# Patient Record
Sex: MALE | Race: WHITE | NOT HISPANIC OR LATINO
[De-identification: names, ages, dates, MRNs, and addresses within clinical notes are randomized per-mention and may not be internally consistent; named-entity substitution may affect disease eponyms.]

---

## 2017-04-09 ENCOUNTER — HOSPITAL (OUTPATIENT)
Dept: OTHER | Age: 72
End: 2017-04-09
Attending: EMERGENCY MEDICINE

## 2018-02-01 PROBLEM — I70.0 THORACIC AORTA ATHEROSCLEROSIS (HCC): Status: ACTIVE | Noted: 2018-02-01

## 2018-02-01 PROBLEM — Z86.010 HISTORY OF COLON POLYPS: Status: ACTIVE | Noted: 2018-02-01

## 2018-02-01 PROBLEM — I10 ESSENTIAL HYPERTENSION: Status: ACTIVE | Noted: 2018-02-01

## 2018-02-01 PROBLEM — M47.816 ARTHRITIS, LUMBAR SPINE: Status: ACTIVE | Noted: 2018-02-01

## 2018-02-01 PROBLEM — I70.0 AORTIC CALCIFICATION (HCC): Status: ACTIVE | Noted: 2018-02-01

## 2018-02-21 PROCEDURE — 82043 UR ALBUMIN QUANTITATIVE: CPT | Performed by: INTERNAL MEDICINE

## 2018-02-21 PROCEDURE — 84153 ASSAY OF PSA TOTAL: CPT | Performed by: INTERNAL MEDICINE

## 2018-02-21 PROCEDURE — 82570 ASSAY OF URINE CREATININE: CPT | Performed by: INTERNAL MEDICINE

## 2019-04-06 PROBLEM — M47.816 FACET ARTHRITIS OF LUMBAR REGION: Status: ACTIVE | Noted: 2019-04-06

## 2019-04-06 PROBLEM — N18.30 CKD (CHRONIC KIDNEY DISEASE) STAGE 3, GFR 30-59 ML/MIN (HCC): Status: ACTIVE | Noted: 2019-04-06

## 2020-09-09 PROBLEM — M47.816 ARTHRITIS OF FACET JOINT OF LUMBAR SPINE: Status: ACTIVE | Noted: 2019-04-06

## 2021-02-01 DIAGNOSIS — Z23 NEED FOR VACCINATION: ICD-10-CM

## 2021-02-03 ENCOUNTER — IMMUNIZATION (OUTPATIENT)
Dept: LAB | Age: 76
End: 2021-02-03
Attending: HOSPITALIST
Payer: MEDICARE

## 2021-02-03 DIAGNOSIS — Z23 NEED FOR VACCINATION: Primary | ICD-10-CM

## 2021-02-03 PROCEDURE — 0001A SARSCOV2 VAC 30MCG/0.3ML IM: CPT

## 2021-02-24 ENCOUNTER — IMMUNIZATION (OUTPATIENT)
Dept: LAB | Age: 76
End: 2021-02-24
Attending: HOSPITALIST
Payer: MEDICARE

## 2021-02-24 DIAGNOSIS — Z23 NEED FOR VACCINATION: Primary | ICD-10-CM

## 2021-02-24 PROCEDURE — 0002A SARSCOV2 VAC 30MCG/0.3ML IM: CPT

## 2021-06-16 PROBLEM — M47.816 ARTHRITIS OF FACET JOINT OF LUMBAR SPINE: Status: RESOLVED | Noted: 2019-04-06 | Resolved: 2021-06-16

## 2021-08-12 PROBLEM — E03.9 HYPOTHYROIDISM, UNSPECIFIED TYPE: Status: ACTIVE | Noted: 2021-08-12

## 2021-12-22 PROBLEM — M46.96 UNSPECIFIED INFLAMMATORY SPONDYLOPATHY, LUMBAR REGION (HCC): Status: ACTIVE | Noted: 2021-12-22

## 2024-07-12 ENCOUNTER — APPOINTMENT (OUTPATIENT)
Dept: GENERAL RADIOLOGY | Facility: HOSPITAL | Age: 79
End: 2024-07-12
Attending: ORTHOPAEDIC SURGERY
Payer: MEDICARE

## 2024-07-12 ENCOUNTER — APPOINTMENT (OUTPATIENT)
Dept: CT IMAGING | Facility: HOSPITAL | Age: 79
End: 2024-07-12
Attending: EMERGENCY MEDICINE
Payer: MEDICARE

## 2024-07-12 ENCOUNTER — APPOINTMENT (OUTPATIENT)
Dept: GENERAL RADIOLOGY | Facility: HOSPITAL | Age: 79
End: 2024-07-12
Attending: EMERGENCY MEDICINE
Payer: MEDICARE

## 2024-07-12 ENCOUNTER — APPOINTMENT (OUTPATIENT)
Dept: MRI IMAGING | Facility: HOSPITAL | Age: 79
End: 2024-07-12
Attending: ORTHOPAEDIC SURGERY
Payer: MEDICARE

## 2024-07-12 ENCOUNTER — HOSPITAL ENCOUNTER (INPATIENT)
Facility: HOSPITAL | Age: 79
LOS: 2 days | Discharge: HOME HEALTH | End: 2024-07-14
Attending: EMERGENCY MEDICINE
Payer: MEDICARE

## 2024-07-12 ENCOUNTER — HOSPITAL ENCOUNTER (INPATIENT)
Facility: HOSPITAL | Age: 79
LOS: 2 days | Discharge: HOME HEALTH | End: 2024-07-14
Attending: EMERGENCY MEDICINE | Admitting: HOSPITALIST
Payer: MEDICARE

## 2024-07-12 DIAGNOSIS — S72.001A CLOSED RIGHT HIP FRACTURE, INITIAL ENCOUNTER (HCC): Primary | ICD-10-CM

## 2024-07-12 LAB
ANION GAP SERPL CALC-SCNC: 9 MMOL/L (ref 0–18)
ATRIAL RATE: 49 BPM
BASOPHILS # BLD AUTO: 0.02 X10(3) UL (ref 0–0.2)
BASOPHILS NFR BLD AUTO: 0.2 %
BUN BLD-MCNC: 29 MG/DL (ref 9–23)
BUN/CREAT SERPL: 18 (ref 10–20)
CALCIUM BLD-MCNC: 8.9 MG/DL (ref 8.7–10.4)
CHLORIDE SERPL-SCNC: 106 MMOL/L (ref 98–112)
CO2 SERPL-SCNC: 23 MMOL/L (ref 21–32)
CREAT BLD-MCNC: 1.61 MG/DL
DEPRECATED RDW RBC AUTO: 41 FL (ref 35.1–46.3)
EGFRCR SERPLBLD CKD-EPI 2021: 43 ML/MIN/1.73M2 (ref 60–?)
EOSINOPHIL # BLD AUTO: 0.06 X10(3) UL (ref 0–0.7)
EOSINOPHIL NFR BLD AUTO: 0.5 %
ERYTHROCYTE [DISTWIDTH] IN BLOOD BY AUTOMATED COUNT: 12.4 % (ref 11–15)
FLUAV + FLUBV RNA SPEC NAA+PROBE: NEGATIVE
FLUAV + FLUBV RNA SPEC NAA+PROBE: NEGATIVE
GLUCOSE BLD-MCNC: 139 MG/DL (ref 70–99)
HCT VFR BLD AUTO: 43.5 %
HGB BLD-MCNC: 15.1 G/DL
IMM GRANULOCYTES # BLD AUTO: 0.06 X10(3) UL (ref 0–1)
IMM GRANULOCYTES NFR BLD: 0.5 %
LYMPHOCYTES # BLD AUTO: 1.5 X10(3) UL (ref 1–4)
LYMPHOCYTES NFR BLD AUTO: 13.5 %
MCH RBC QN AUTO: 31.1 PG (ref 26–34)
MCHC RBC AUTO-ENTMCNC: 34.7 G/DL (ref 31–37)
MCV RBC AUTO: 89.7 FL
MONOCYTES # BLD AUTO: 0.72 X10(3) UL (ref 0.1–1)
MONOCYTES NFR BLD AUTO: 6.5 %
MRSA NASAL: NEGATIVE
NEUTROPHILS # BLD AUTO: 8.75 X10 (3) UL (ref 1.5–7.7)
NEUTROPHILS # BLD AUTO: 8.75 X10(3) UL (ref 1.5–7.7)
NEUTROPHILS NFR BLD AUTO: 78.8 %
OSMOLALITY SERPL CALC.SUM OF ELEC: 294 MOSM/KG (ref 275–295)
P AXIS: 37 DEGREES
P-R INTERVAL: 146 MS
PLATELET # BLD AUTO: 296 10(3)UL (ref 150–450)
POTASSIUM SERPL-SCNC: 3.3 MMOL/L (ref 3.5–5.1)
Q-T INTERVAL: 450 MS
QRS DURATION: 86 MS
QTC CALCULATION (BEZET): 406 MS
R AXIS: 2 DEGREES
RBC # BLD AUTO: 4.85 X10(6)UL
RSV RNA SPEC NAA+PROBE: NEGATIVE
SARS-COV-2 RNA RESP QL NAA+PROBE: NOT DETECTED
SODIUM SERPL-SCNC: 138 MMOL/L (ref 136–145)
STAPH A BY PCR: POSITIVE
T AXIS: 9 DEGREES
VENTRICULAR RATE: 49 BPM
WBC # BLD AUTO: 11.1 X10(3) UL (ref 4–11)

## 2024-07-12 PROCEDURE — 93010 ELECTROCARDIOGRAM REPORT: CPT | Performed by: STUDENT IN AN ORGANIZED HEALTH CARE EDUCATION/TRAINING PROGRAM

## 2024-07-12 PROCEDURE — 96374 THER/PROPH/DIAG INJ IV PUSH: CPT

## 2024-07-12 PROCEDURE — 96376 TX/PRO/DX INJ SAME DRUG ADON: CPT

## 2024-07-12 PROCEDURE — 80048 BASIC METABOLIC PNL TOTAL CA: CPT | Performed by: EMERGENCY MEDICINE

## 2024-07-12 PROCEDURE — 87640 STAPH A DNA AMP PROBE: CPT | Performed by: EMERGENCY MEDICINE

## 2024-07-12 PROCEDURE — 73700 CT LOWER EXTREMITY W/O DYE: CPT | Performed by: EMERGENCY MEDICINE

## 2024-07-12 PROCEDURE — 73502 X-RAY EXAM HIP UNI 2-3 VIEWS: CPT | Performed by: EMERGENCY MEDICINE

## 2024-07-12 PROCEDURE — 99285 EMERGENCY DEPT VISIT HI MDM: CPT

## 2024-07-12 PROCEDURE — 96375 TX/PRO/DX INJ NEW DRUG ADDON: CPT

## 2024-07-12 PROCEDURE — 73721 MRI JNT OF LWR EXTRE W/O DYE: CPT | Performed by: ORTHOPAEDIC SURGERY

## 2024-07-12 PROCEDURE — 0241U SARS-COV-2/FLU A AND B/RSV BY PCR (GENEXPERT): CPT | Performed by: EMERGENCY MEDICINE

## 2024-07-12 PROCEDURE — 93005 ELECTROCARDIOGRAM TRACING: CPT

## 2024-07-12 PROCEDURE — 87641 MR-STAPH DNA AMP PROBE: CPT | Performed by: EMERGENCY MEDICINE

## 2024-07-12 PROCEDURE — 71045 X-RAY EXAM CHEST 1 VIEW: CPT | Performed by: EMERGENCY MEDICINE

## 2024-07-12 PROCEDURE — 85025 COMPLETE CBC W/AUTO DIFF WBC: CPT | Performed by: EMERGENCY MEDICINE

## 2024-07-12 RX ORDER — LOSARTAN POTASSIUM 100 MG/1
100 TABLET ORAL DAILY
Status: DISCONTINUED | OUTPATIENT
Start: 2024-07-12 | End: 2024-07-14

## 2024-07-12 RX ORDER — HYDROCODONE BITARTRATE AND ACETAMINOPHEN 5; 325 MG/1; MG/1
2 TABLET ORAL EVERY 4 HOURS PRN
Status: DISCONTINUED | OUTPATIENT
Start: 2024-07-12 | End: 2024-07-14

## 2024-07-12 RX ORDER — TRIAMTERENE AND HYDROCHLOROTHIAZIDE 37.5; 25 MG/1; MG/1
1 CAPSULE ORAL EVERY MORNING
Status: DISCONTINUED | OUTPATIENT
Start: 2024-07-12 | End: 2024-07-14

## 2024-07-12 RX ORDER — MORPHINE SULFATE 4 MG/ML
4 INJECTION, SOLUTION INTRAMUSCULAR; INTRAVENOUS ONCE
Status: COMPLETED | OUTPATIENT
Start: 2024-07-12 | End: 2024-07-12

## 2024-07-12 RX ORDER — KETOROLAC TROMETHAMINE 30 MG/ML
30 INJECTION, SOLUTION INTRAMUSCULAR; INTRAVENOUS ONCE
Status: COMPLETED | OUTPATIENT
Start: 2024-07-12 | End: 2024-07-12

## 2024-07-12 RX ORDER — ACETAMINOPHEN 500 MG
1000 TABLET ORAL ONCE
Status: COMPLETED | OUTPATIENT
Start: 2024-07-12 | End: 2024-07-12

## 2024-07-12 RX ORDER — METOCLOPRAMIDE HYDROCHLORIDE 5 MG/ML
5 INJECTION INTRAMUSCULAR; INTRAVENOUS EVERY 8 HOURS PRN
Status: DISCONTINUED | OUTPATIENT
Start: 2024-07-12 | End: 2024-07-14

## 2024-07-12 RX ORDER — MORPHINE SULFATE 4 MG/ML
4 INJECTION, SOLUTION INTRAMUSCULAR; INTRAVENOUS EVERY 2 HOUR PRN
Status: DISCONTINUED | OUTPATIENT
Start: 2024-07-12 | End: 2024-07-14

## 2024-07-12 RX ORDER — ZOLPIDEM TARTRATE 5 MG/1
10 TABLET ORAL NIGHTLY PRN
Status: DISCONTINUED | OUTPATIENT
Start: 2024-07-12 | End: 2024-07-12

## 2024-07-12 RX ORDER — MORPHINE SULFATE 2 MG/ML
2 INJECTION, SOLUTION INTRAMUSCULAR; INTRAVENOUS EVERY 2 HOUR PRN
Status: DISCONTINUED | OUTPATIENT
Start: 2024-07-12 | End: 2024-07-14

## 2024-07-12 RX ORDER — HYDROCODONE BITARTRATE AND ACETAMINOPHEN 5; 325 MG/1; MG/1
1 TABLET ORAL EVERY 4 HOURS PRN
Status: DISCONTINUED | OUTPATIENT
Start: 2024-07-12 | End: 2024-07-14

## 2024-07-12 RX ORDER — SODIUM CHLORIDE 9 MG/ML
INJECTION, SOLUTION INTRAVENOUS CONTINUOUS
Status: DISCONTINUED | OUTPATIENT
Start: 2024-07-12 | End: 2024-07-13

## 2024-07-12 RX ORDER — HEPARIN SODIUM 5000 [USP'U]/ML
5000 INJECTION, SOLUTION INTRAVENOUS; SUBCUTANEOUS EVERY 8 HOURS SCHEDULED
Status: DISCONTINUED | OUTPATIENT
Start: 2024-07-13 | End: 2024-07-14

## 2024-07-12 RX ORDER — LORAZEPAM 1 MG/1
0.5 TABLET ORAL EVERY 4 HOURS PRN
Status: DISCONTINUED | OUTPATIENT
Start: 2024-07-12 | End: 2024-07-14

## 2024-07-12 RX ORDER — ACETAMINOPHEN 325 MG/1
650 TABLET ORAL EVERY 4 HOURS PRN
Status: DISCONTINUED | OUTPATIENT
Start: 2024-07-12 | End: 2024-07-14

## 2024-07-12 RX ORDER — MORPHINE SULFATE 2 MG/ML
1 INJECTION, SOLUTION INTRAMUSCULAR; INTRAVENOUS EVERY 2 HOUR PRN
Status: DISCONTINUED | OUTPATIENT
Start: 2024-07-12 | End: 2024-07-14

## 2024-07-12 RX ORDER — ONDANSETRON 2 MG/ML
4 INJECTION INTRAMUSCULAR; INTRAVENOUS EVERY 6 HOURS PRN
Status: DISCONTINUED | OUTPATIENT
Start: 2024-07-12 | End: 2024-07-14

## 2024-07-12 RX ORDER — OXYCODONE HYDROCHLORIDE 5 MG/1
5 TABLET ORAL ONCE
Status: COMPLETED | OUTPATIENT
Start: 2024-07-12 | End: 2024-07-12

## 2024-07-12 RX ORDER — LEVOTHYROXINE SODIUM 0.03 MG/1
25 TABLET ORAL
Status: DISCONTINUED | OUTPATIENT
Start: 2024-07-13 | End: 2024-07-14

## 2024-07-12 RX ORDER — CYCLOBENZAPRINE HCL 5 MG
10 TABLET ORAL 3 TIMES DAILY PRN
Status: DISCONTINUED | OUTPATIENT
Start: 2024-07-12 | End: 2024-07-14

## 2024-07-12 RX ORDER — LORAZEPAM 1 MG/1
0.5 TABLET ORAL ONCE
Status: DISCONTINUED | OUTPATIENT
Start: 2024-07-12 | End: 2024-07-12

## 2024-07-12 NOTE — H&P
[facility] [place]       CC:   Chief Complaint   Patient presents with    Hip Pain        PCP: Norman Townsend MD    Date of Admission: 7/12/2024  7:23 AM    ASSESSMENT / PLAN:       Mr. Hammer is an 80 yo M with PMH of CKD3, HTN, hypothyroidism who presented with hip pain found to have hip fracture    R hip fracture  - XR normal but CT with nondisplaced femur fracture  - ortho consulted  - NPO, IVF, pain meds PRN    Pre-op clearance  - no hx of CP, SOB, no HF symptoms  - no hx of Afib or arrythmia, CAD/MI, TIA/CVA  - no issues with prior anesthesia, did have issues with IV   - baseline labs acceptable  - await EKG and CXR   - pending EKG and CXR results, acceptable risk for surgery     HTN  - BP stable  - hold home losartan, triamerene-hydrochlorothiazide in case plan for surgery today    CKD3  - Cr 1.4- 1.5 at baseline  - admission BMP- Cr 1.6    Hypothyroidism  - continue synthroid 25 mcg daily    ACP  - CODE- FULL confirmed with patient and family  - POA- daughter Liza Hammer  - lives at home with 2 adult children, daughter has back problems    FN:  - IVF: NS  - Diet: NPO    DVT Prophy: SCD  Lines: PIV    Dispo: pending clinical course    Outpatient records or previous hospital records reviewed.     Further recommendations pending patient's clinical course.  [facility] [place] to follow patient while in house    Patient and/or patient's family given opportunity to ask questions and note understanding and agreeing with therapeutic plan as outlined    Dominique Pineda MD  [facility] [place]  Answering Service number: [phone]    HPI       History of Present Illness:     Mr. Hammer is an 80 yo M with PMH of CKD3, HTN, hypothyroidism who presented with hip pain. Patient states hip pain started yesterday while he was walking in his home. Denies trauma. States he has been having issues with is R hip for a while. Recalls an event 7 years ago when he was walking up the stairs carrying a case  of water bottles and had severe hip pain, felt like he pulled something. Had to use crutches for a week. States he went to urgent care and had an XR at that time that was negative.     In the ED, vitals stable. XR hip negative. CT hip with fracture. Ortho consulted.       PMH  Past Medical History:    Aortic calcification (HCC)    Arthritis, lumbar spine    CKD (chronic kidney disease)    CKD (chronic kidney disease) stage 3, GFR 30-59 ml/min (HCC)    Diverticulitis    Essential hypertension    Facet arthritis of lumbar region    H/O left hemicolectomy    History of colon polyps    HTN (hypertension)    Hypothyroidism, unspecified type    Thoracic aorta atherosclerosis (HCC)    Unspecified inflammatory spondylopathy, lumbar region (HCC)        PSH  History reviewed. No pertinent surgical history.     ALL:  Allergies   Allergen Reactions    Demerol UNKNOWN    Fentanyl         Home Medications:  No outpatient medications have been marked as taking for the 24 encounter (Hospital Encounter).         Soc Hx  Social History     Tobacco Use    Smoking status: Former     Current packs/day: 0.00     Types: Cigarettes     Quit date: 1959     Years since quittin.5    Smokeless tobacco: Never   Substance Use Topics    Alcohol use: Not Currently     Comment: rarely        Fam Hx  Family History   Problem Relation Age of Onset    Heart Disorder Mother        Review of Systems  Comprehensive ROS reviewed and negative except for what's stated above.     OBJECTIVE:  /62   Pulse 55   Temp 97.5 °F (36.4 °C) (Oral)   Resp 14   Ht 5' 6\" (1.676 m)   Wt 170 lb (77.1 kg)   SpO2 96%   BMI 27.44 kg/m²     GEN: male in NAD, appears younger than stated age  HEENT: EOMI,  Pulm: CTAB, no crackles or wheezes  CV: RRR, no murmurs  ABD: Soft, non-tender, non-distended, +BS  SKIN: warm, dry  EXT: no edema    Diagnostic Data:    CBC/Chem    Recent Labs   Lab 24  1029   RBC 4.85   HGB 15.1   HCT 43.5   MCV 89.7   MCH  31.1   MCHC 34.7   RDW 12.4   NEPRELIM 8.75*   WBC 11.1*   .0         No results for input(s): \"GLU\", \"BUN\", \"CREATSERUM\", \"GFRAA\", \"GFRNAA\", \"EGFRCR\", \"CA\", \"NA\", \"K\", \"CL\", \"CO2\" in the last 168 hours.  Lab Results   Component Value Date    WBC 11.1 07/12/2024    HGB 15.1 07/12/2024    HCT 43.5 07/12/2024    .0 07/12/2024    CREATSERUM 1.61 07/12/2024    BUN 29 07/12/2024     07/12/2024    K 3.3 07/12/2024     07/12/2024    CO2 23.0 07/12/2024     07/12/2024    CA 8.9 07/12/2024       No results for input(s): \"TROP\" in the last 168 hours.    Additional Diagnostics:     Radiology: CT HIP(BONE) RIGHT (CPT=73700)    Result Date: 7/12/2024  CONCLUSION:  Nondisplaced subcapital fracture.       Dictated by (CST): Ronen Thompson MD on 7/12/2024 at 10:29 AM     Finalized by (CST): Ronen Thompson MD on 7/12/2024 at 10:43 AM          XR HIP W OR WO PELVIS 2 OR 3 VIEWS, RIGHT (CPT=73502)    Result Date: 7/12/2024  CONCLUSION:  1. No acute appearing fracture or dislocation.  Mild central narrowing and mild degenerative change of both hip joints.    Dictated by (CST): Rene Dawson MD on 7/12/2024 at 9:02 AM     Finalized by (CST): Rene Dawson MD on 7/12/2024 at 9:03 AM

## 2024-07-12 NOTE — CONSULTS
[facility]  part of [facility]    Report of Consultation    Vick Hammer Patient Status:  Inpatient    1945 MRN [id number]   Location [facility] 4W/SW/SE Attending Dominique Pineda MD   Hosp Day # 0 PCP Norman Townsend MD     Date of Admission:  2024  Date of Consult:  2024  Reason for Consultation:   R hip pain    History of Present Illness:   Patient is a 79 year old male who was admitted to the hospital for Closed right hip fracture, initial encounter (HCC):  Pt developed pain over the last 2 days. No history of trauma. No hx of falls. Presented to [facility] ED for evaluation. Had CT which revealed possible FNFx. Admitted to the floor and my partner, Dr. Michelle Elliott, was initially called to evaluate patient. She contacted me and I agreed to see the patient for evaluation. Pt has had no CP, SOB, F/C. Does have hx of L-spine surgery in the past. Points to the lateral and posterior hip where he has the majority of his pain.    Past Medical History  Past Medical History:    Aortic calcification (HCC)    Arthritis, lumbar spine    CKD (chronic kidney disease)    CKD (chronic kidney disease) stage 3, GFR 30-59 ml/min (HCC)    Diverticulitis    Essential hypertension    Facet arthritis of lumbar region    H/O left hemicolectomy    History of colon polyps    HTN (hypertension)    Hypothyroidism, unspecified type    Thoracic aorta atherosclerosis (HCC)    Unspecified inflammatory spondylopathy, lumbar region (HCC)       Past Surgical History  History reviewed. No pertinent surgical history.    Family History  Family History   Problem Relation Age of Onset    Heart Disorder Mother        Social History  Social History     Socioeconomic History    Marital status:    Tobacco Use    Smoking status: Former     Current packs/day: 0.00     Types: Cigarettes     Quit date: 1959     Years since quittin.5    Smokeless tobacco: Never   Substance and Sexual  Activity    Alcohol use: Not Currently     Comment: rarely    Drug use: No    Sexual activity: Yes     Social Determinants of Health     Food Insecurity: No Food Insecurity (7/12/2024)    Food Insecurity     Food Insecurity: Never true   Transportation Needs: No Transportation Needs (7/12/2024)    Transportation Needs     Lack of Transportation: No   Housing Stability: Low Risk  (7/12/2024)    Housing Stability     Housing Instability: No           Current Medications:  Current Facility-Administered Medications   Medication Dose Route Frequency    sodium chloride 0.9% infusion   Intravenous Continuous    [START ON 7/13/2024] heparin (Porcine) 5000 UNIT/ML injection 5,000 Units  5,000 Units Subcutaneous Q8H LAMONT    acetaminophen (Tylenol) tab 650 mg  650 mg Oral Q4H PRN    Or    HYDROcodone-acetaminophen (Norco) 5-325 MG per tab 1 tablet  1 tablet Oral Q4H PRN    Or    HYDROcodone-acetaminophen (Norco) 5-325 MG per tab 2 tablet  2 tablet Oral Q4H PRN    morphINE PF 2 MG/ML injection 1 mg  1 mg Intravenous Q2H PRN    Or    morphINE PF 2 MG/ML injection 2 mg  2 mg Intravenous Q2H PRN    Or    morphINE PF 4 MG/ML injection 4 mg  4 mg Intravenous Q2H PRN    ondansetron (Zofran) 4 MG/2ML injection 4 mg  4 mg Intravenous Q6H PRN    metoclopramide (Reglan) 5 mg/mL injection 5 mg  5 mg Intravenous Q8H PRN    [START ON 7/13/2024] levothyroxine (Synthroid) tab 25 mcg  25 mcg Oral Before breakfast    [Held by provider] losartan (Cozaar) tab 100 mg  100 mg Oral Daily    [Held by provider] triamterene-hydroCHLOROthiazide (Dyazide) 37.5-25 MG per cap 1 capsule  1 capsule Oral QAM    LORazepam (Ativan) tab 0.5 mg  0.5 mg Oral Q4H PRN     Medications Prior to Admission   Medication Sig    Multiple Vitamins-Minerals (ONE-A-DAY FOR HIM VITACRAVES OR) Take by mouth.    cholecalciferol 1000 UNITS Oral Cap Take 2 capsules (2,000 Units total) by mouth daily.    LOSARTAN 100 MG Oral Tab TAKE 1 TABLET DAILY    TRIAMTERENE-HYDROCHLOROTHIAZIDE  37.5-25 MG Oral Cap TAKE 1 CAPSULE EVERY       MORNING    LEVOTHYROXINE 25 MCG Oral Tab TAKE 1 TABLET BEFORE       BREAKFAST       Allergies  Allergies   Allergen Reactions    Demerol UNKNOWN    Fentanyl        Review of Systems:    Pertinent items are noted in HPI.    Physical Exam:   Blood pressure 134/51, pulse 59, temperature 97.9 °F (36.6 °C), temperature source Oral, resp. rate 16, height 5' 6\" (1.676 m), weight 170 lb (77.1 kg), SpO2 96%.    General appearance: alert, appears stated age, and cooperative  RLE:   No pain with ROM  No pain with axial loading  - Kerri's  EHL, TA, GSC intact  Palp DP  LT intact 1st DWS   Results:     Laboratory Data:  Lab Results   Component Value Date    WBC 11.1 (H) 07/12/2024    HGB 15.1 07/12/2024    HCT 43.5 07/12/2024    .0 07/12/2024    CREATSERUM 1.61 (H) 07/12/2024    BUN 29 (H) 07/12/2024     07/12/2024    K 3.3 (L) 07/12/2024     07/12/2024    CO2 23.0 07/12/2024     (H) 07/12/2024    CA 8.9 07/12/2024    ALB 4.6 06/16/2021    ALKPHO 52 06/16/2021    TP 7.6 06/16/2021    AST 24 06/16/2021    ALT 31 06/16/2021    T4F 0.95 08/05/2021    TSH 3.220 12/22/2021    PSA 4.37 (H) 03/28/2022    BNP 27.0 09/17/2012    B12 847 06/16/2021         Imaging:  MRI HIP LIMITED FX (2) SEQUENCES RIGHT (CPT = 50152)    Result Date: 7/12/2024  CONCLUSION:  No MR evidence of acute fracture of the right hip.    Results of this examination were discussed with the patient's physician, Dr. Mayer, by Dr. Ramon at 1748 on 07/12/2024.  Dictated by (CST): Migel Ramon MD on 7/12/2024 at 5:48 PM     Finalized by (CST): Migel Ramon MD on 7/12/2024 at 5:54 PM          XR CHEST AP PORTABLE  (CPT=71045)    Result Date: 7/12/2024  CONCLUSION: No radiographic evidence of acute cardiopulmonary abnormality.    Dictated by (CST): Hari Rowan MD on 7/12/2024 at 1:15 PM     Finalized by (CST): Hari Rowan MD on 7/12/2024 at 1:15 PM          CT HIP(BONE) RIGHT  (CPT=73700)    Result Date: 7/12/2024  CONCLUSION:  Nondisplaced subcapital fracture.       Dictated by (CST): Ronen Thompson MD on 7/12/2024 at 10:29 AM     Finalized by (CST): Ronen Thompson MD on 7/12/2024 at 10:43 AM          XR HIP W OR WO PELVIS 2 OR 3 VIEWS, RIGHT (CPT=73502)    Result Date: 7/12/2024  CONCLUSION:  1. No acute appearing fracture or dislocation.  Mild central narrowing and mild degenerative change of both hip joints.    Dictated by (CST): Rene Dawson MD on 7/12/2024 at 9:02 AM     Finalized by (CST): Rene Dawson MD on 7/12/2024 at 9:03 AM              Impression:     Right hip pain    79M that sustained right hip pain.   -MRI confirmed no evidence of Right hip fx.  -pt will be re-admitted to floor for pain control  -WBAT RLE  -PT/OT  -no acute orthopaedic intervention needed  -may f/u in office as out patient  -call [phone] for appt  -may be  Thank you for allowing me to participate in the care of your patient.    Michi Mayer MD  7/12/2024

## 2024-07-12 NOTE — ED PROVIDER NOTES
Patient Seen in: [facility] Emergency Department      History     Chief Complaint   Patient presents with    Hip Pain     Stated Complaint: R hip pain    Subjective:   HPI    Pt is 80 yo M who arrives by EMS from home with acute on chronic right hip pain x 2 days. No recent trauma. No swelling. No radiation of pain or numbness. No fevers. Taking tylenol at home. Feels similar to bursitis attacks he has had in past but hard to ambulate due to pain. Has been using crutches.     Objective:   Past Medical History:    Aortic calcification (HCC)    Arthritis, lumbar spine    CKD (chronic kidney disease)    CKD (chronic kidney disease) stage 3, GFR 30-59 ml/min (HCC)    Diverticulitis    Essential hypertension    Facet arthritis of lumbar region    H/O left hemicolectomy    History of colon polyps    HTN (hypertension)    Hypothyroidism, unspecified type    Thoracic aorta atherosclerosis (HCC)    Unspecified inflammatory spondylopathy, lumbar region (HCC)              History reviewed. No pertinent surgical history.             Social History     Socioeconomic History    Marital status:    Tobacco Use    Smoking status: Former     Current packs/day: 0.00     Types: Cigarettes     Quit date: 1959     Years since quittin.5    Smokeless tobacco: Never   Substance and Sexual Activity    Alcohol use: Not Currently     Comment: rarely    Drug use: No    Sexual activity: Yes              Review of Systems    Positive for stated Chief Complaint: Hip Pain    Other systems are as noted in HPI.  Constitutional and vital signs reviewed.      All other systems reviewed and negative except as noted above.    Physical Exam     ED Triage Vitals [24 0726]   /83   Pulse 56   Resp 19   Temp 97.5 °F (36.4 °C)   Temp src Oral   SpO2 99 %   O2 Device None (Room air)       Current Vitals:   Vital Signs  BP: 113/60  Pulse: 53  Resp: 16  Temp: 97.5 °F (36.4 °C)  Temp src: Oral  MAP (mmHg): 76    Oxygen  Therapy  SpO2: 94 %  O2 Device: None (Room air)            Physical Exam    GENERAL: No acute distress, awake and alert, lying on left side  HEENT: EOMI, PERRL  Neck: supple  CV:2+ DP pulse, normal cap refill  Ab: soft, nontender, no distension  Extremities: no edema or deformities, compartments soft/nontender. Joints nontender. Minimal ROM of right hip  Neuro: CN intact, normal speech, normal sensation; no focal deficits  SKIN: warm, dry, no rashes      ED Course     Labs Reviewed   BASIC METABOLIC PANEL (8) - Abnormal; Notable for the following components:       Result Value    Glucose 139 (*)     Potassium 3.3 (*)     BUN 29 (*)     Creatinine 1.61 (*)     eGFR-Cr 43 (*)     All other components within normal limits   CBC W/ DIFFERENTIAL - Abnormal; Notable for the following components:    WBC 11.1 (*)     Neutrophil Absolute Prelim 8.75 (*)     Neutrophil Absolute 8.75 (*)     All other components within normal limits   CBC WITH DIFFERENTIAL WITH PLATELET    Narrative:     The following orders were created for panel order CBC With Differential With Platelet.  Procedure                               Abnormality         Status                     ---------                               -----------         ------                     CBC W/ DIFFERENTIAL[[id number]]          Abnormal            Final result                 Please view results for these tests on the individual orders.   MRSA/SA SCRN BY PCR:EMERG ORTHO SURG ONLY          MDM         Medical Decision Making  Possible bursitis attack  Morphine/toradol given  Pt continues to have pain with movement of the hip. Recommend CT imaging after unremarkable xray results.     Amount and/or Complexity of Data Reviewed  External Data Reviewed: radiology.     Details: Prior xrays, ultrasounds from last year reviewed  Labs: ordered.  Radiology: ordered.     Details: CT HIP(BONE) RIGHT (CPT=73700)    Result Date: 7/12/2024  CONCLUSION:  Nondisplaced subcapital  fracture.       Dictated by (CST): Ronen Thompson MD on 7/12/2024 at 10:29 AM     Finalized by (CST): Ronen Thompson MD on 7/12/2024 at 10:43 AM          XR HIP W OR WO PELVIS 2 OR 3 VIEWS, RIGHT (CPT=73502)    Result Date: 7/12/2024  CONCLUSION:  1. No acute appearing fracture or dislocation.  Mild central narrowing and mild degenerative change of both hip joints.    Dictated by (CST): Rene Dawson MD on 7/12/2024 at 9:02 AM     Finalized by (CST): Rene Dawson MD on 7/12/2024 at 9:03 AM            Discussion of management or test interpretation with external provider(s): D/w dr Pineda hospitalist  D/w dr Elliott orthopedics    Risk  Parenteral controlled substances.  Decision regarding hospitalization.        Disposition and Plan     Clinical Impression:  1. Closed right hip fracture, initial encounter ([facility])         Disposition:  Admit  7/12/2024 11:05 am    Follow-up:  No follow-up provider specified.  We recommend that you schedule follow up care with a primary care provider within the next three months to obtain basic health screening including reassessment of your blood pressure.      Medications Prescribed:  Current Discharge Medication List                            Hospital Problems       Present on Admission  Date Reviewed: 3/28/2022            ICD-10-CM Noted POA    * (Principal) Closed right hip fracture, initial encounter (HCC) S72.001A 7/12/2024 Unknown

## 2024-07-12 NOTE — ED INITIAL ASSESSMENT (HPI)
Pt to ED A&O x 4 via EMS for c/o R hip pain.  Per EMS, pt w/ hx bursitis.  Pt denies injury or trauma.

## 2024-07-12 NOTE — ED QUICK NOTES
Pt left dept prior to new orders being placed.  MD notified.  Attempted to contact floor RN to communicate orders needing to be done, but no answer.

## 2024-07-12 NOTE — ED QUICK NOTES
Orders for admission, patient is aware of plan and ready to go upstairs. Any questions, please call ED RN Ness at extension [phone].     Patient Covid vaccination status: Fully vaccinated     COVID Test Ordered in ED: None    COVID Suspicion at Admission: N/A    Running Infusions:  None    Mental Status/LOC at time of transport: A&O x 4    Other pertinent information:   CIWA score: N/A   NIH score:  N/A     20g L FA PIV saline locked  20g R hand PIV saline locked  Pt on RA

## 2024-07-13 ENCOUNTER — APPOINTMENT (OUTPATIENT)
Dept: GENERAL RADIOLOGY | Facility: HOSPITAL | Age: 79
End: 2024-07-13
Attending: HOSPITALIST
Payer: MEDICARE

## 2024-07-13 LAB
ANION GAP SERPL CALC-SCNC: 6 MMOL/L (ref 0–18)
BUN BLD-MCNC: 23 MG/DL (ref 9–23)
BUN/CREAT SERPL: 14.6 (ref 10–20)
CALCIUM BLD-MCNC: 8.4 MG/DL (ref 8.7–10.4)
CHLORIDE SERPL-SCNC: 110 MMOL/L (ref 98–112)
CO2 SERPL-SCNC: 25 MMOL/L (ref 21–32)
CREAT BLD-MCNC: 1.58 MG/DL
DEPRECATED RDW RBC AUTO: 41.4 FL (ref 35.1–46.3)
EGFRCR SERPLBLD CKD-EPI 2021: 44 ML/MIN/1.73M2 (ref 60–?)
ERYTHROCYTE [DISTWIDTH] IN BLOOD BY AUTOMATED COUNT: 12.6 % (ref 11–15)
GLUCOSE BLD-MCNC: 112 MG/DL (ref 70–99)
HCT VFR BLD AUTO: 42.3 %
HGB BLD-MCNC: 14.9 G/DL
MCH RBC QN AUTO: 31.7 PG (ref 26–34)
MCHC RBC AUTO-ENTMCNC: 35.2 G/DL (ref 31–37)
MCV RBC AUTO: 90 FL
OSMOLALITY SERPL CALC.SUM OF ELEC: 296 MOSM/KG (ref 275–295)
PLATELET # BLD AUTO: 294 10(3)UL (ref 150–450)
POTASSIUM SERPL-SCNC: 3.6 MMOL/L (ref 3.5–5.1)
RBC # BLD AUTO: 4.7 X10(6)UL
SODIUM SERPL-SCNC: 141 MMOL/L (ref 136–145)
WBC # BLD AUTO: 12 X10(3) UL (ref 4–11)

## 2024-07-13 PROCEDURE — 85027 COMPLETE CBC AUTOMATED: CPT | Performed by: HOSPITALIST

## 2024-07-13 PROCEDURE — 72100 X-RAY EXAM L-S SPINE 2/3 VWS: CPT | Performed by: HOSPITALIST

## 2024-07-13 PROCEDURE — 97530 THERAPEUTIC ACTIVITIES: CPT

## 2024-07-13 PROCEDURE — 80048 BASIC METABOLIC PNL TOTAL CA: CPT | Performed by: HOSPITALIST

## 2024-07-13 PROCEDURE — 97162 PT EVAL MOD COMPLEX 30 MIN: CPT

## 2024-07-13 RX ORDER — IBUPROFEN 400 MG/1
400 TABLET ORAL 3 TIMES DAILY
Status: COMPLETED | OUTPATIENT
Start: 2024-07-13 | End: 2024-07-13

## 2024-07-13 NOTE — PHYSICAL THERAPY NOTE
PHYSICAL THERAPY EVALUATION - INPATIENT     Room Number: 473/473-A  Evaluation Date: 7/13/2024  Type of Evaluation: Initial   Physician Order: PT Eval and Treat    Presenting Problem: right Hip pain contributing to inability to ambulate or WB on right LE  .  Ortho team has consulted approving therapy participation and WBAT for right LE at this time.  See current imaging in chart .  PMH pt with hx of chronic right hip pain last 7 years but three days ago pain increased to intolerable level with difficulty WB on right LE  contributing to need for admission .   Enlarged prostrate .     Reason for Therapy: Mobility Dysfunction and Discharge Planning    PHYSICAL THERAPY ASSESSMENT   Patient is a 79 year old male admitted 7/12/2024 for Presenting Problem: right Hip pain contributing to inability to ambulate or WB on right LE  .  Ortho team has consulted approving therapy participation and WBAT for right LE at this time.  See current imaging in chart .  Pt denies any falls or trauma .  HPI /PMH : pt with hx of chronic right hip pain last 7 years but three days ago pain increased to intolerable level with difficulty WB on right LE  contributing to need for admission .   Enlarged prostrate ..      Prior to admission, patient's baseline is Indep ADL and community ambulation with no AD , denies hx of falls.  Patient is currently functioning below baseline with bed mobility, transfers, gait, stair negotiation, standing prolonged periods, and performing household tasks.  Patient is requiring contact guard assist as a result of the following impairments: decreased functional strength, pain, impaired standing balance, decreased muscular endurance, medical status, and limited right hip /knee /ankle  ROM.  Physical Therapy will continue to follow for duration of hospitalization.    Pt is alert and oriented x 3 reporting need to use bathroom.  Pt is received laying in bed with right LE knee flexed with pillow under knee.  Pt denies  pain at rest stating it took him time to find this comfortable position,.  Pt required education and encouragement on importance of mobility , B AP and following precautionary spine precautions at this time.  Pt denies any numbness or tingling. Pt with poor tolerance for B AP resting in bed stating just moving his right ankle increases his hip area pain .  Pt localized pain to right GT area radiating into right thigh and LE . Pt rating pain at 8/10 with bed mobility . Once sitting at EOB pain decreasing.  Pt denies pain with B AP in seated position ...  Pt reports coughing during session is increasing hip right hip area / LE Pain .    Pt reports pain decreasing to 1-3/10 with ambulation this session using a RW .  Pt with need to void with significant difficulty voiding observed.   Pt stating this is related to his \"constipation / enlarged prostrate\".  Bubble chat with primary care MD and RN informing of summary of therapy session/findings.  Pt family present for session and all education / recommendations.     Patient will benefit from continued skilled PT Services at discharge to promote prior level of function and safety with additional support and return home with home health PT vs ability to progress to home with OP PT .  Pt will need a RW at [facility] as use of RW helped with current pain reports .      PLAN  PT Treatment Plan: Bed mobility;Body mechanics;Endurance;Energy conservation;Patient education;Family education;Gait training;Balance training;Transfer training;Stair training  Rehab Potential : Good  Frequency (Obs): 5x/week    PHYSICAL THERAPY MEDICAL/SOCIAL HISTORY   History related to current admission:       From ortho consult :  Right hip pain     79M that sustained right hip pain.   -MRI confirmed no evidence of Right hip fx.  -pt will be re-admitted to floor for pain control  -WBAT RLE  -PT/OT  -no acute orthopaedic intervention needed  -may f/u in office as out patient  -call [phone] for appt  -may  be  Thank you for allowing me to participate in the care of your patient.     Michi Mayer MD  7/12/2024                    From primary care MD   Mr. Hammer is an 80 yo M with PMH of CKD3, HTN, hypothyroidism who presented with hip pain found to have hip fracture     R hip pain  Possibly related to right greater trochanteric bursitis from recent overuse  -Trial lidocaine patch and 3 doses of NSAID  -Caution given CKD  F/U lumbar spine xray    flexeril PRN  PRN pain meds  PT OT eval   Could consider injection vs steroids tomorrow if pain persistent      HTN  - BP stable  - hold home losartan, triamerene-hydrochlorothiazide as BP ok      CKD3  - Cr 1.4- 1.5 at baseline  Monitor daily      Hypothyroidism  - continue synthroid 25 mcg daily     ACP  - CODE- FULL confirmed with patient and family  - POA- daughter Liza Hammer  - lives at home with 2 adult children, daughter has back problems     FN:  - IVF: Stopped   - Diet:general diet ok      DVT Prophy: Heparin TID   Lines: PIV     Dispo: pending clinical course, PT OT, may need TAE         Questions/concerns were discussed with patient and/or family by bedside.        Thank You,  Lucian Mcdermott,      Problem List  Principal Problem:    Closed right hip fracture, initial encounter ([facility])      HOME SITUATION  Home Situation  Type of Home: House  Home Layout: Two level;Able to live on main level  Stairs to Enter : 5  Railing: Yes  Lives With:  (son and DIL both work --no physical assist)  Patient Owned Equipment: Rolling walker;Cane (shower chair / step over tub   raised toliet)       SUBJECTIVE  Agreeable to mobility    \" It hurts when I cough in my right hip / LE \"     Pain reports as above . Pt with goal for DC to home setting .    PHYSICAL THERAPY EXAMINATION   OBJECTIVE  Precautions: Limb alert - right (Precautionary spine reinforced)  Fall Risk: Standard fall risk    WEIGHT BEARING RESTRICTION  Weight Bearing Restriction: R lower extremity;None        R  Lower Extremity: Weight Bearing as Tolerated       PAIN ASSESSMENT  Ratin  Location: At rest no pain  increased to 8/10 getting out of bed   Pain decreased back to 1-3/10 during fxn mobility and ambulation . pt reports coughing increases his pain  Management Techniques: Activity promotion;Body mechanics;Breathing techniques;Relaxation;Repositioning    COGNITION  Overall Cognitive Status:  WFL - within functional limits    RANGE OF MOTION AND STRENGTH ASSESSMENT  Upper extremity ROM and strength are within functional limits   Left Lower extremity ROM is within functional limits   Left Lower extremity strength is within functional limits     Pt initially reluctant to move right LE due to pain / stating even a right ankle pump while supine is increasing his right hip pain \"I am not interested in making things worse \"--declining to attempt .    In sitting with hip / knee flexed pt denies pain with B AP and noted full and strong B ankle DF/ PF .   Unable to fully assess right hip and knee due to pt reports pain and reluctance to assessment .     Pt able to ambulate using RW denies any right knee instability . Pt standing symmetrically on B LE during voiding at [employer]  Static Sitting: Good  Dynamic Sitting: Not tested  Static Standing: Fair  Dynamic Standing: Fair -       NEUROLOGICAL FINDINGS    Coughing increase right buttock and LE Symptoms per pt       Not formally assessed  --denies any numbness and tingling   Difficulty voiding observed reported to RN / MD per pt related to his constipation and prostrate                 ACTIVITY TOLERANCE  Pulse: 70        BP: 132/71 (resting  post activity  132/69)             O2 WALK  Oxygen Therapy  SPO2% on Room Air at Rest: 99  SPO2% Ambulation on Room Air: 98    AM-PAC '6-Clicks' INPATIENT SHORT FORM - BASIC MOBILITY  How much difficulty does the patient currently have...  Patient Difficulty: Turning over in bed (including adjusting bedclothes, sheets and  blankets)?: A Little   Patient Difficulty: Sitting down on and standing up from a chair with arms (e.g., wheelchair, bedside commode, etc.): A Little   Patient Difficulty: Moving from lying on back to sitting on the side of the bed?: A Little   How much help from another person does the patient currently need...   Help from Another: Moving to and from a bed to a chair (including a wheelchair)?: A Little   Help from Another: Need to walk in hospital room?: A Little   Help from Another: Climbing 3-5 steps with a railing?: A Little     AM-PAC Score:  Raw Score: 18   Approx Degree of Impairment: 46.58%   Standardized Score (AM-PAC Scale): 43.63   CMS Modifier (G-Code): CK    FUNCTIONAL ABILITY STATUS  Functional Mobility/Gait Assessment  Gait Assistance: Contact guard assist  Distance (ft): in room mobility for tolieting . pt able to stand at toliet for attempt at voiding with symmetrical WB on B LE observed with UE support. Pt able to amb with RW which helped with pain control for 300 ft x 1  Assistive Device: Rolling walker  Pattern: R Decreased stance time;R Steppage;L Steppage  Stairs:  (stair training offered --pt declined)  Rolling: contact guard assist  Supine to Sit:  CGA to min assist  log roll sequencing demonstrated     Sit to Stand: contact guard assist    Patient received supine in bed, agreeable to physical therapy. Vital signs monitored as noted above, no adverse symptoms and patient stable during session.     Education with pt  provided verbally, via demonstration, and while practicing during session on Spine precautions, Physical therapy plan of care, physiological benefits of out of bed mobility, and B AP , proper resting position of right LE , fxn mobility training with RW , tolieting activity , pacing and pain management education initiated and DC Planning  .         The patient's Approx Degree of Impairment: 46.58% has been calculated based on documentation in the [facility] '6 clicks' Inpatient Basic  Mobility Short Form.  Research supports that patients with this level of impairment may benefit from home with HH PT .Pt with goal for DC to home setting.  Pt will need a RW at [place] .  Therapy anticipates pt will progress to DC to home setting with 24hr support of family recommended with HH PT vs pt ability to progress this admission to home with OP PT .  Pt will need a RW at [place] ,.  Communication with Primary care MD and RN about current status provided.  Pt with Lumbar spine xrays in chart not read at time of PT eval.       Final disposition will be made by interdisciplinary medical team.    Patient End of Session: Up in chair;Needs met;Call light within reach;RN aware of session/findings;All patient questions and concerns addressed;Alarm set;Family present    CURRENT GOALS  Goals to be met by: 7/24/24  Patient Goal Patient's self-stated goal is: home    Goal #1 Patient is able to demonstrate supine - sit EOB @ level: modified independent     Goal #1   Current Status    Goal #2 Patient is able to demonstrate transfers EOB to/from Chair/Wheelchair at assistance level: modified independent with walker - rolling and LRAD     Goal #2  Current Status    Goal #3 Patient is able to ambulate 440  feet with assist device: walker - rolling and LRAD  at assistance level: supervision   Goal #3   Current Status    Goal #4 Patient will negotiate 5 stairs/one curb w/ assistive device and supervision   Goal #4   Current Status    Goal #5 Patient to demonstrate independence with home activity/exercise instructions provided to patient in preparation for discharge.   Goal #5   Current Status    Goal #6    Goal #6  Current Status      Patient Evaluation Complexity Level:  History Moderate - 1 or 2 personal factors and/or co-morbidities   Examination of body systems Moderate - addressing a total of 3 or more elements   Clinical Presentation Stable    Clinical Decision Making  Moderate Complexity   PT eval and therapy activity 3 units

## 2024-07-13 NOTE — PLAN OF CARE
Patient is alert and oriented x4. Patient is on room air and VSS. Patient is on IVF. Patient is on a general diet. Patient is ambulating with SBA and a walker.  Patient denies n/v/d and sob. Patient reported severe pain, prn norco x1 was administered and was ineffective, MD notified. MD ordered prn flexeril and prn oxy IR 5mg. Medications were administered and were effective. Patient is voiding and last had a bm 7/11/24. VTE prophylaxis and safety measures are in place.       Problem: Patient Centered Care  Goal: Patient preferences are identified and integrated in the patient's plan of care  Description: Interventions:  - What would you like us to know as we care for you? I am from home.   - Provide timely, complete, and accurate information to patient/family  - Incorporate patient and family knowledge, values, beliefs, and cultural backgrounds into the planning and delivery of care  - Encourage patient/family to participate in care and decision-making at the level they choose  - Honor patient and family perspectives and choices  Outcome: Progressing     Problem: Patient/Family Goals  Goal: Patient/Family Long Term Goal  Description: Patient's Long Term Goal: Discharge home     Interventions:  - Monitor vitals  - Monitor labs  - Monitor imaging  - Manage pain   - PT/OT on consult  - VTE prophylaxis   - IVF  - See additional Care Plan goals for specific interventions  Outcome: Progressing  Goal: Patient/Family Short Term Goal  Description: Patient's Short Term Goal: Manage pain     Interventions:  - Frequent pain assessments  - Non-pharmacological interventions  - Pain medications as needed/indicated  - See additional Care Plan goals for specific interventions  Outcome: Progressing     Problem: PAIN - ADULT  Goal: Verbalizes/displays adequate comfort level or patient's stated pain goal  Description: INTERVENTIONS:  - Encourage pt to monitor pain and request assistance  - Assess pain using appropriate pain scale  -  Administer analgesics based on type and severity of pain and evaluate response  - Implement non-pharmacological measures as appropriate and evaluate response  - Consider cultural and social influences on pain and pain management  - Manage/alleviate anxiety  - Utilize distraction and/or relaxation techniques  - Monitor for opioid side effects  - Notify MD/LIP if interventions unsuccessful or patient reports new pain  - Anticipate increased pain with activity and pre-medicate as appropriate  Outcome: Progressing     Problem: SAFETY ADULT - FALL  Goal: Free from fall injury  Description: INTERVENTIONS:  - Assess pt frequently for physical needs  - Identify cognitive and physical deficits and behaviors that affect risk of falls.  - Jay Em fall precautions as indicated by assessment.  - Educate pt/family on patient safety including physical limitations  - Instruct pt to call for assistance with activity based on assessment  - Modify environment to reduce risk of injury  - Provide assistive devices as appropriate  - Consider OT/PT consult to assist with strengthening/mobility  - Encourage toileting schedule  Outcome: Progressing     Problem: DISCHARGE PLANNING  Goal: Discharge to home or other facility with appropriate resources  Description: INTERVENTIONS:  - Identify barriers to discharge w/pt and caregiver  - Include patient/family/discharge partner in discharge planning  - Arrange for needed discharge resources and transportation as appropriate  - Identify discharge learning needs (meds, wound care, etc)  - Arrange for interpreters to assist at discharge as needed  - Consider post-discharge preferences of patient/family/discharge partner  - Complete POLST form as appropriate  - Assess patient's ability to be responsible for managing their own health  - Refer to Case Management Department for coordinating discharge planning if the patient needs post-hospital services based on physician/LIP order or complex needs  related to functional status, cognitive ability or social support system  Outcome: Progressing     Problem: HEMATOLOGIC - ADULT  Goal: Maintains hematologic stability  Description: INTERVENTIONS  - Assess for signs and symptoms of bleeding or hemorrhage  - Monitor labs and vital signs for trends  - Administer supportive blood products/factors, fluids and medications as ordered and appropriate  - Administer supportive blood products/factors as ordered and appropriate  Outcome: Progressing  Goal: Free from bleeding injury  Description: (Example usage: patient with low platelets)  INTERVENTIONS:  - Avoid intramuscular injections, enemas and rectal medication administration  - Ensure safe mobilization of patient  - Hold pressure on venipuncture sites to achieve adequate hemostasis  - Assess for signs and symptoms of internal bleeding  - Monitor lab trends  - Patient is to report abnormal signs of bleeding to staff  - Avoid use of toothpicks and dental floss  - Use electric shaver for shaving  - Use soft bristle tooth brush  - Limit straining and forceful nose blowing  Outcome: Progressing     Problem: MUSCULOSKELETAL - ADULT  Goal: Return mobility to safest level of function  Description: INTERVENTIONS:  - Assess patient stability and activity tolerance for standing, transferring and ambulating w/ or w/o assistive devices  - Assist with transfers and ambulation using safe patient handling equipment as needed  - Ensure adequate protection for wounds/incisions during mobilization  - Obtain PT/OT consults as needed  - Advance activity as appropriate  - Communicate ordered activity level and limitations with patient/family  Outcome: Progressing     Problem: Impaired Functional Mobility  Goal: Achieve highest/safest level of mobility/gait  Description: Interventions:  - Assess patient's functional ability and stability  - Promote increasing activity/tolerance for mobility and gait  - Educate and engage patient/family in  tolerated activity level and precautions  - Recommend use of  mariann-walker for transfers and ambulation  - Recommend patient transfer to bedside chair toward strongest side  - Recommend use of chair position in bed 3 times per day  Outcome: Progressing     Problem: Impaired Activities of Daily Living  Goal: Achieve highest/safest level of independence in self care  Description: Interventions:  - Assess ability and encourage patient to participate in ADLs to maximize function  - Promote sitting position while performing ADLs such as feeding, grooming, and bathing  - Educate and encourage patient/family in tolerated functional activity level and precautions during self-care  - Provide support under elbow of weak side to prevent shoulder subluxation  - Encourage patient to incorporate impaired side during daily activities to promote function  Outcome: Progressing     Problem: ANXIETY  Goal: Will report anxiety at manageable levels  Description: INTERVENTIONS:  - Administer medication as ordered  - Teach and rehearse alternative coping skills  - Provide emotional support with 1:1 interaction with staff  Outcome: Progressing     Problem: COPING  Goal: Pt/Family able to verbalize concerns and demonstrate effective coping strategies  Description: INTERVENTIONS:  - Assist patient/family to identify coping skills, available support systems and cultural and spiritual values  - Provide emotional support, including active listening and acknowledgement of concerns of patient and caregivers  - Reduce environmental stimuli, as able  - Instruct patient/family in relaxation techniques, as appropriate  - Assess for spiritual and psychosocial needs and initiate Spiritual Care or Behavioral Health consult as needed  Outcome: Progressing

## 2024-07-13 NOTE — PROGRESS NOTES
[facility] Hospitalist Progress Note     CC: Hospital Follow up    PCP: Norman Townsend MD       Assessment/Plan:     Principal Problem:    Closed right hip fracture, initial encounter ([facility])      Mr. Hammer is an 80 yo M with PMH of CKD3, HTN, hypothyroidism who presented with hip pain found to have hip fracture     R hip pain  Possibly related to right greater trochanteric bursitis from recent overuse  -Trial lidocaine patch and 3 doses of NSAID  -Caution given CKD  F/U lumbar spine xray    flexeril PRN  PRN pain meds  PT OT eval   Could consider injection vs steroids tomorrow if pain persistent     HTN  - BP stable  - hold home losartan, triamerene-hydrochlorothiazide as BP ok      CKD3  - Cr 1.4- 1.5 at baseline  Monitor daily      Hypothyroidism  - continue synthroid 25 mcg daily     ACP  - CODE- FULL confirmed with patient and family  - POA- daughter Liza Hammer  - lives at home with 2 adult children, daughter has back problems     FN:  - IVF: Stopped   - Diet:general diet ok      DVT Prophy: Heparin TID   Lines: PIV     Dispo: pending clinical course, PT OT, may need TAE       Questions/concerns were discussed with patient and/or family by bedside.      Thank You,  Lucian Mcdermott, DO    Hospitalist with [facility]     Subjective:     Ongoing right lateral hip pain seems to be worse with any movements including movement at knee no nausea or vomiting no chest pain or shortness of breath does report recently recovering from cold    OBJECTIVE:    Blood pressure 110/49, pulse 60, temperature 97.7 °F (36.5 °C), temperature source Oral, resp. rate 16, height 5' 6\" (1.676 m), weight 170 lb (77.1 kg), SpO2 98%.    Temp:  [97.7 °F (36.5 °C)-98.5 °F (36.9 °C)] 97.7 °F (36.5 °C)  Pulse:  [51-60] 60  Resp:  [14-20] 16  BP: (102-134)/(47-87) 110/49  SpO2:  [94 %-100 %] 98 %      Intake/Output:    Intake/Output Summary (Last 24 hours) at 7/13/2024 0978  Last data filed at 7/13/2024 0600  Gross  per 24 hour   Intake 913 ml   Output 200 ml   Net 713 ml       Last 3 Weights   07/12/24 1534 170 lb (77.1 kg)   07/12/24 0726 170 lb (77.1 kg)   07/12/24 1449 170 lb (77.1 kg)   02/21/22 1009 196 lb (88.9 kg)       Exam   Gen: No acute distress, alert and oriented x3  Pulm: Lungs clear, normal respiratory effort  CV: Heart with regular rate and rhythm, no peripheral edema  Abd: Abdomen soft, nontender, nondistended, bowel sounds present  MSK: right hip tenderness over the greater trochanter negative straight leg raise negative for fransisca       Data Review:       Labs:     Recent Labs   Lab 07/12/24  1029 07/13/24  0530   RBC 4.85 4.70   HGB 15.1 14.9   HCT 43.5 42.3   MCV 89.7 90.0   MCH 31.1 31.7   MCHC 34.7 35.2   RDW 12.4 12.6   NEPRELIM 8.75*  --    WBC 11.1* 12.0*   .0 294.0         Recent Labs   Lab 07/12/24  1029 07/13/24  0530   * 112*   BUN 29* 23   CREATSERUM 1.61* 1.58*   EGFRCR 43* 44*   CA 8.9 8.4*    141   K 3.3* 3.6    110   CO2 23.0 25.0       No results for input(s): \"ALT\", \"AST\", \"ALB\", \"AMYLASE\", \"LIPASE\", \"LDH\" in the last 168 hours.    Invalid input(s): \"ALPHOS\", \"TBIL\", \"DBIL\", \"TPROT\"      Imaging:  MRI HIP LIMITED FX (2) SEQUENCES RIGHT (CPT = 61896)    Result Date: 7/12/2024  CONCLUSION:  No MR evidence of acute fracture of the right hip.    Results of this examination were discussed with the patient's physician, Dr. Mayer, by Dr. Ramon at 1748 on 07/12/2024.  Dictated by (CST): Migel Ramon MD on 7/12/2024 at 5:48 PM     Finalized by (CST): Migel Ramon MD on 7/12/2024 at 5:54 PM          XR CHEST AP PORTABLE  (CPT=71045)    Result Date: 7/12/2024  CONCLUSION: No radiographic evidence of acute cardiopulmonary abnormality.    Dictated by (CST): Hari Rowan MD on 7/12/2024 at 1:15 PM     Finalized by (CST): Hari Rowan MD on 7/12/2024 at 1:15 PM          CT HIP(BONE) RIGHT (CPT=73700)    Result Date: 7/12/2024  CONCLUSION:  Nondisplaced subcapital  fracture.       Dictated by (CST): Ronen Thompson MD on 7/12/2024 at 10:29 AM     Finalized by (CST): Ronen Thompson MD on 7/12/2024 at 10:43 AM          XR HIP W OR WO PELVIS 2 OR 3 VIEWS, RIGHT (CPT=73502)    Result Date: 7/12/2024  CONCLUSION:  1. No acute appearing fracture or dislocation.  Mild central narrowing and mild degenerative change of both hip joints.    Dictated by (CST): Rene Dawson MD on 7/12/2024 at 9:02 AM     Finalized by (CST): Rene Dawson MD on 7/12/2024 at 9:03 AM             Meds:      lidocaine-menthol  1 patch Transdermal Daily    ibuprofen  400 mg Oral TID    heparin  5,000 Units Subcutaneous Q8H LAMONT    levothyroxine  25 mcg Oral Before breakfast    [Held by provider] losartan  100 mg Oral Daily    [Held by provider] triamterene-hydroCHLOROthiazide  1 capsule Oral QAM         acetaminophen **OR** HYDROcodone-acetaminophen **OR** HYDROcodone-acetaminophen    morphINE **OR** morphINE **OR** morphINE    ondansetron    metoclopramide    LORazepam    cyclobenzaprine

## 2024-07-14 VITALS
WEIGHT: 170 LBS | BODY MASS INDEX: 27.32 KG/M2 | TEMPERATURE: 97 F | HEIGHT: 66 IN | OXYGEN SATURATION: 96 % | DIASTOLIC BLOOD PRESSURE: 66 MMHG | RESPIRATION RATE: 18 BRPM | SYSTOLIC BLOOD PRESSURE: 121 MMHG | HEART RATE: 79 BPM

## 2024-07-14 LAB
ANION GAP SERPL CALC-SCNC: 7 MMOL/L (ref 0–18)
BUN BLD-MCNC: 27 MG/DL (ref 9–23)
BUN/CREAT SERPL: 16.5 (ref 10–20)
CALCIUM BLD-MCNC: 8.8 MG/DL (ref 8.7–10.4)
CHLORIDE SERPL-SCNC: 108 MMOL/L (ref 98–112)
CO2 SERPL-SCNC: 26 MMOL/L (ref 21–32)
CREAT BLD-MCNC: 1.64 MG/DL
EGFRCR SERPLBLD CKD-EPI 2021: 42 ML/MIN/1.73M2 (ref 60–?)
GLUCOSE BLD-MCNC: 114 MG/DL (ref 70–99)
MAGNESIUM SERPL-MCNC: 2.1 MG/DL (ref 1.6–2.6)
OSMOLALITY SERPL CALC.SUM OF ELEC: 298 MOSM/KG (ref 275–295)
PHOSPHATE SERPL-MCNC: 3.5 MG/DL (ref 2.4–5.1)
POTASSIUM SERPL-SCNC: 3.2 MMOL/L (ref 3.5–5.1)
SODIUM SERPL-SCNC: 141 MMOL/L (ref 136–145)

## 2024-07-14 PROCEDURE — 84100 ASSAY OF PHOSPHORUS: CPT | Performed by: INTERNAL MEDICINE

## 2024-07-14 PROCEDURE — 83735 ASSAY OF MAGNESIUM: CPT | Performed by: INTERNAL MEDICINE

## 2024-07-14 PROCEDURE — 80048 BASIC METABOLIC PNL TOTAL CA: CPT | Performed by: INTERNAL MEDICINE

## 2024-07-14 RX ORDER — TIZANIDINE HYDROCHLORIDE 4 MG/1
4 CAPSULE, GELATIN COATED ORAL 3 TIMES DAILY PRN
Qty: 21 CAPSULE | Refills: 0 | Status: SHIPPED | OUTPATIENT
Start: 2024-07-14

## 2024-07-14 RX ORDER — HYDROCODONE BITARTRATE AND ACETAMINOPHEN 5; 325 MG/1; MG/1
1 TABLET ORAL EVERY 6 HOURS PRN
Qty: 10 TABLET | Refills: 0 | Status: SHIPPED | OUTPATIENT
Start: 2024-07-14

## 2024-07-14 RX ORDER — POTASSIUM CHLORIDE 20 MEQ/1
40 TABLET, EXTENDED RELEASE ORAL ONCE
Status: COMPLETED | OUTPATIENT
Start: 2024-07-14 | End: 2024-07-14

## 2024-07-14 NOTE — CM/SW NOTE
MDO for dc.    CM self ref to case for dc planning  PT rec HHC vs Outpt.    CM called pt to discuss. Pt sts he will be home bound and prefers HHC rehab. Pt is agreeable to [facility] if available.    CM sent ref, f2f done    CM Confirmed address on file and PCP.    [address]  [facility] res'd, liaison notified of dc.    Plan  Home w/ HHC pending provider    / to remain available for support and/or discharge planning.     Zoë Quinn, RN    Ext [phone]

## 2024-07-14 NOTE — PLAN OF CARE
Patient is alert and oriented x4. Patient is on room air and VSS. Patient is ambulating with SBA and a walker. Patient is on a general diet and is saline locked. Patient denies n/v/d and sob. Patient  is on scheduled motrin for pain. Patient reported severe pain, prn norco  mg x2 and prn flexeril x2 were administered and were effective. Patient is voiding freely and last had a bm 7/11/24. VTE prophylaxis and safety measures are in place.        Problem: Patient Centered Care  Goal: Patient preferences are identified and integrated in the patient's plan of care  Description: Interventions:  - What would you like us to know as we care for you? I am from home.   - Provide timely, complete, and accurate information to patient/family  - Incorporate patient and family knowledge, values, beliefs, and cultural backgrounds into the planning and delivery of care  - Encourage patient/family to participate in care and decision-making at the level they choose  - Honor patient and family perspectives and choices  Outcome: Progressing     Problem: Patient/Family Goals  Goal: Patient/Family Long Term Goal  Description: Patient's Long Term Goal: Discharge home     Interventions:  - Monitor vitals  - Monitor labs  - Monitor imaging  - Manage pain   - PT/OT on consult  - VTE prophylaxis   - IVF  - See additional Care Plan goals for specific interventions  Outcome: Progressing  Goal: Patient/Family Short Term Goal  Description: Patient's Short Term Goal: Manage pain     Interventions:  - Frequent pain assessments  - Non-pharmacological interventions  - Pain medications as needed/indicated  - See additional Care Plan goals for specific interventions  Outcome: Progressing     Problem: PAIN - ADULT  Goal: Verbalizes/displays adequate comfort level or patient's stated pain goal  Description: INTERVENTIONS:  - Encourage pt to monitor pain and request assistance  - Assess pain using appropriate pain scale  - Administer analgesics based  on type and severity of pain and evaluate response  - Implement non-pharmacological measures as appropriate and evaluate response  - Consider cultural and social influences on pain and pain management  - Manage/alleviate anxiety  - Utilize distraction and/or relaxation techniques  - Monitor for opioid side effects  - Notify MD/LIP if interventions unsuccessful or patient reports new pain  - Anticipate increased pain with activity and pre-medicate as appropriate  Outcome: Progressing     Problem: SAFETY ADULT - FALL  Goal: Free from fall injury  Description: INTERVENTIONS:  - Assess pt frequently for physical needs  - Identify cognitive and physical deficits and behaviors that affect risk of falls.  - [place] fall precautions as indicated by assessment.  - Educate pt/family on patient safety including physical limitations  - Instruct pt to call for assistance with activity based on assessment  - Modify environment to reduce risk of injury  - Provide assistive devices as appropriate  - Consider OT/PT consult to assist with strengthening/mobility  - Encourage toileting schedule  Outcome: Progressing     Problem: DISCHARGE PLANNING  Goal: Discharge to home or other facility with appropriate resources  Description: INTERVENTIONS:  - Identify barriers to discharge w/pt and caregiver  - Include patient/family/discharge partner in discharge planning  - Arrange for needed discharge resources and transportation as appropriate  - Identify discharge learning needs (meds, wound care, etc)  - Arrange for interpreters to assist at discharge as needed  - Consider post-discharge preferences of patient/family/discharge partner  - Complete POLST form as appropriate  - Assess patient's ability to be responsible for managing their own health  - Refer to Case Management Department for coordinating discharge planning if the patient needs post-hospital services based on physician/LIP order or complex needs related to functional status,  cognitive ability or social support system  Outcome: Progressing     Problem: HEMATOLOGIC - ADULT  Goal: Maintains hematologic stability  Description: INTERVENTIONS  - Assess for signs and symptoms of bleeding or hemorrhage  - Monitor labs and vital signs for trends  - Administer supportive blood products/factors, fluids and medications as ordered and appropriate  - Administer supportive blood products/factors as ordered and appropriate  Outcome: Progressing  Goal: Free from bleeding injury  Description: (Example usage: patient with low platelets)  INTERVENTIONS:  - Avoid intramuscular injections, enemas and rectal medication administration  - Ensure safe mobilization of patient  - Hold pressure on venipuncture sites to achieve adequate hemostasis  - Assess for signs and symptoms of internal bleeding  - Monitor lab trends  - Patient is to report abnormal signs of bleeding to staff  - Avoid use of toothpicks and dental floss  - Use electric shaver for shaving  - Use soft bristle tooth brush  - Limit straining and forceful nose blowing  Outcome: Progressing     Problem: MUSCULOSKELETAL - ADULT  Goal: Return mobility to safest level of function  Description: INTERVENTIONS:  - Assess patient stability and activity tolerance for standing, transferring and ambulating w/ or w/o assistive devices  - Assist with transfers and ambulation using safe patient handling equipment as needed  - Ensure adequate protection for wounds/incisions during mobilization  - Obtain PT/OT consults as needed  - Advance activity as appropriate  - Communicate ordered activity level and limitations with patient/family  Outcome: Progressing     Problem: Impaired Functional Mobility  Goal: Achieve highest/safest level of mobility/gait  Description: Interventions:  - Assess patient's functional ability and stability  - Promote increasing activity/tolerance for mobility and gait  - Educate and engage patient/family in tolerated activity level and  precautions  - Recommend use of sit-stand lift for transfers  - Recommend use of  mariann-walker for transfers and ambulation  - Recommend use of chair position in bed 3 times per day  Outcome: Progressing     Problem: Impaired Activities of Daily Living  Goal: Achieve highest/safest level of independence in self care  Description: Interventions:  - Assess ability and encourage patient to participate in ADLs to maximize function  - Promote sitting position while performing ADLs such as feeding, grooming, and bathing  - Educate and encourage patient/family in tolerated functional activity level and precautions during self-care  - Encourage patient to incorporate impaired side during daily activities to promote function  Outcome: Progressing     Problem: ANXIETY  Goal: Will report anxiety at manageable levels  Description: INTERVENTIONS:  - Administer medication as ordered  - Teach and rehearse alternative coping skills  - Provide emotional support with 1:1 interaction with staff  Outcome: Progressing     Problem: COPING  Goal: Pt/Family able to verbalize concerns and demonstrate effective coping strategies  Description: INTERVENTIONS:  - Assist patient/family to identify coping skills, available support systems and cultural and spiritual values  - Provide emotional support, including active listening and acknowledgement of concerns of patient and caregivers  - Reduce environmental stimuli, as able  - Instruct patient/family in relaxation techniques, as appropriate  - Assess for spiritual and psychosocial needs and initiate Spiritual Care or Behavioral Health consult as needed  Outcome: Progressing

## 2024-07-14 NOTE — PROGRESS NOTES
Patient received from ER alert and oriented x4. Vitals stable. Pain being managed with Morphine. This RN was called to room by family and noted patient to be very agitated and restless stating he can't continue to lay in bed and needed to get up; patient educated that he needed to remain bedrest until it was made clear or not about hip fracture. Patient stating he did not want the surgery and wanted to leave. Dr Valladares notified and patient was agreeing to take ativan. Ativan given and patient able to relax some. Sent down for MRI and returned stable. Daughter thinks the morphine contributes to the anxiety states so patient switched to taking Norco. Tolerating general diet. Patient is ambulating with SBA and a walker. Patient denies n/v/d and sob. Patient voiding freely, states no BM. Bed locked an in lowest position, call light within reach, has been calling appropriately for assistance. Family at bedside.

## 2024-07-14 NOTE — PLAN OF CARE
Orders received for patient discharge. Patient transported home w/ family. All discharge instructions were discussed with patient and/or family. Patient verbalizes understanding and agreeable to plan of care & follow-up plan as outlined in discharge summary / AVS and had no further questions regarding discharge instruction. Norco Paper script given to pt/family. All patient belongings were transported with patient/family at time of discharge. Home w/ a walker.     Vick is aox4, tolerating diet, ambulating w/ a walker, Pain managed with Norco and lidocaine patch for right hip pain.  Electrolytes replaced per protocol. Family at bedside. Call light within reach, calls appropriately. Safety plan in place.    Problem: Patient Centered Care  Goal: Patient preferences are identified and integrated in the patient's plan of care  Description: Interventions:  - What would you like us to know as we care for you? I am from home.   - Provide timely, complete, and accurate information to patient/family  - Incorporate patient and family knowledge, values, beliefs, and cultural backgrounds into the planning and delivery of care  - Encourage patient/family to participate in care and decision-making at the level they choose  - Honor patient and family perspectives and choices  Outcome: Adequate for Discharge     Problem: Patient/Family Goals  Goal: Patient/Family Long Term Goal  Description: Patient's Long Term Goal: Discharge home     Interventions:  - Monitor vitals  - Monitor labs  - Monitor imaging  - Manage pain   - PT/OT on consult  - VTE prophylaxis   - IVF  - See additional Care Plan goals for specific interventions  Outcome: Adequate for Discharge  Goal: Patient/Family Short Term Goal  Description: Patient's Short Term Goal: Manage pain     Interventions:  - Frequent pain assessments  - Non-pharmacological interventions  - Pain medications as needed/indicated  - See additional Care Plan goals for specific  interventions  Outcome: Adequate for Discharge     Problem: PAIN - ADULT  Goal: Verbalizes/displays adequate comfort level or patient's stated pain goal  Description: INTERVENTIONS:  - Encourage pt to monitor pain and request assistance  - Assess pain using appropriate pain scale  - Administer analgesics based on type and severity of pain and evaluate response  - Implement non-pharmacological measures as appropriate and evaluate response  - Consider cultural and social influences on pain and pain management  - Manage/alleviate anxiety  - Utilize distraction and/or relaxation techniques  - Monitor for opioid side effects  - Notify MD/LIP if interventions unsuccessful or patient reports new pain  - Anticipate increased pain with activity and pre-medicate as appropriate  Outcome: Adequate for Discharge     Problem: SAFETY ADULT - FALL  Goal: Free from fall injury  Description: INTERVENTIONS:  - Assess pt frequently for physical needs  - Identify cognitive and physical deficits and behaviors that affect risk of falls.  - [place] fall precautions as indicated by assessment.  - Educate pt/family on patient safety including physical limitations  - Instruct pt to call for assistance with activity based on assessment  - Modify environment to reduce risk of injury  - Provide assistive devices as appropriate  - Consider OT/PT consult to assist with strengthening/mobility  - Encourage toileting schedule  Outcome: Adequate for Discharge     Problem: DISCHARGE PLANNING  Goal: Discharge to home or other facility with appropriate resources  Description: INTERVENTIONS:  - Identify barriers to discharge w/pt and caregiver  - Include patient/family/discharge partner in discharge planning  - Arrange for needed discharge resources and transportation as appropriate  - Identify discharge learning needs (meds, wound care, etc)  - Arrange for interpreters to assist at discharge as needed  - Consider post-discharge preferences of  patient/family/discharge partner  - Complete POLST form as appropriate  - Assess patient's ability to be responsible for managing their own health  - Refer to Case Management Department for coordinating discharge planning if the patient needs post-hospital services based on physician/LIP order or complex needs related to functional status, cognitive ability or social support system  Outcome: Adequate for Discharge     Problem: HEMATOLOGIC - ADULT  Goal: Maintains hematologic stability  Description: INTERVENTIONS  - Assess for signs and symptoms of bleeding or hemorrhage  - Monitor labs and vital signs for trends  - Administer supportive blood products/factors, fluids and medications as ordered and appropriate  - Administer supportive blood products/factors as ordered and appropriate  Outcome: Adequate for Discharge  Goal: Free from bleeding injury  Description: (Example usage: patient with low platelets)  INTERVENTIONS:  - Avoid intramuscular injections, enemas and rectal medication administration  - Ensure safe mobilization of patient  - Hold pressure on venipuncture sites to achieve adequate hemostasis  - Assess for signs and symptoms of internal bleeding  - Monitor lab trends  - Patient is to report abnormal signs of bleeding to staff  - Avoid use of toothpicks and dental floss  - Use electric shaver for shaving  - Use soft bristle tooth brush  - Limit straining and forceful nose blowing  Outcome: Adequate for Discharge     Problem: MUSCULOSKELETAL - ADULT  Goal: Return mobility to safest level of function  Description: INTERVENTIONS:  - Assess patient stability and activity tolerance for standing, transferring and ambulating w/ or w/o assistive devices  - Assist with transfers and ambulation using safe patient handling equipment as needed  - Ensure adequate protection for wounds/incisions during mobilization  - Obtain PT/OT consults as needed  - Advance activity as appropriate  - Communicate ordered activity  level and limitations with patient/family  Outcome: Adequate for Discharge     Problem: Impaired Functional Mobility  Goal: Achieve highest/safest level of mobility/gait  Description: Interventions:  - Assess patient's functional ability and stability  - Promote increasing activity/tolerance for mobility and gait  - Educate and engage patient/family in tolerated activity level and precautions  - Recommend use of  RW for transfers and ambulation  Outcome: Adequate for Discharge     Problem: Impaired Activities of Daily Living  Goal: Achieve highest/safest level of independence in self care  Description: Interventions:  - Assess ability and encourage patient to participate in ADLs to maximize function  - Promote sitting position while performing ADLs such as feeding, grooming, and bathing  - Educate and encourage patient/family in tolerated functional activity level and precautions during self-care  - Encourage patient to incorporate impaired side during daily activities to promote function  Outcome: Adequate for Discharge     Problem: ANXIETY  Goal: Will report anxiety at manageable levels  Description: INTERVENTIONS:  - Administer medication as ordered  - Teach and rehearse alternative coping skills  - Provide emotional support with 1:1 interaction with staff  Outcome: Adequate for Discharge     Problem: COPING  Goal: Pt/Family able to verbalize concerns and demonstrate effective coping strategies  Description: INTERVENTIONS:  - Assist patient/family to identify coping skills, available support systems and cultural and spiritual values  - Provide emotional support, including active listening and acknowledgement of concerns of patient and caregivers  - Reduce environmental stimuli, as able  - Instruct patient/family in relaxation techniques, as appropriate  - Assess for spiritual and psychosocial needs and initiate Spiritual Care or Behavioral Health consult as needed  Outcome: Adequate for Discharge

## 2024-07-14 NOTE — DISCHARGE INSTRUCTIONS
You can use the pain meds as needed, and then try tylenol as needed, advise heat to lateral hip and stretches as able       Sometimes managing your health at home requires assistance.  The [facility] team has recognized your preference to use [facility].  They can be reached by phone at [phone].  The fax number for your reference is [phone].  A representative from the home health agency will contact you or your family to schedule your first visit.

## 2024-07-14 NOTE — PROGRESS NOTES
Patient is alert and oriented x4. Vitals stable. Pain being managed now with scheduled motrin and lidocaine patch and PRN Norco. Sent for Xray of lumbar spine, results discussed with patient and family per Dr Mcdermott message. Patient now SL. Tolerating general diet. Patient is ambulating with SBA and a walker.  Patient denies n/v/d and sob. Patient voiding freely, states no BM. Bed locked an in lowest position, call light within reach, has been calling appropriately for assistance. Family at bedside. Plans for discharge home tomorrow.

## 2024-07-14 NOTE — DISCHARGE SUMMARY
General Medicine Discharge Summary     Patient ID:  Vick Hammer  79 year old  4/12/1945    Admit date: 7/12/2024    Discharge date and time: 7/14/2024    Attending Physician: Lucian Mcdermott DO     Consults: IP CONSULT TO ORTHOPEDIC SURGERY    Primary Care Physician: Norman Townsend MD     Reason for admission: Right hip pain initially thought to be a fracture    Risk For Readmission: Low    Discharge Diagnoses: Right greater trochanteric bursitis  See Additional Discharge Diagnoses in Hospital Course    Discharged Condition: good    Follow-up with labs/images appointments:   Close follow-up with primary care provider was to be arranged by coordinator message sent patient will be contacted to coordinate close follow-up    Exam  Gen: No acute distress  Pulm: Lungs clear, normal respiratory effort  CV: Heart with regular rate and rhythm  Abd: Abdomen soft,   EXT: Some pain over the right lateral greater trochanteric bursa overall pain improved    HPI:   Per Dr. Pineda       Mr. Hammer is an 78 yo M with PMH of CKD3, HTN, hypothyroidism who presented with hip pain. Patient states hip pain started yesterday while he was walking in his home. Denies trauma. States he has been having issues with is R hip for a while. Recalls an event 7 years ago when he was walking up the stairs carrying a case of water bottles and had severe hip pain, felt like he pulled something. Had to use crutches for a week. States he went to urgent care and had an XR at that time that was negative.      In the ED, vitals stable. XR hip negative. CT hip with fracture. Ortho consulted.     Hospital Course:   Admitted for right hip pain that may be related to fracture but MRI did not reveal fracture further evaluation revealed likely greater trochanteric bursitis patient was treated conservatively with NSAIDs and pain meds pain improved with mobility with PT ultimately was discharged home with walker and supervision of family advised  heat and ice stretching close PT follow-up and primary care follow-up Norco and ibuprofen can be given in limited amounts due to chronic kidney disease    Operative Procedures: Procedure(s) (LRB):  RIGHT HIP CLOSED REDUCTION PERCUTANEOUS PINNING (Right)     Imaging: XR LUMBAR SPINE (MIN 2 VIEWS) (CPT=72100)    Result Date: 7/13/2024  CONCLUSION:   No acute fracture or traumatic listhesis of the lumbar spine.  Multilevel degenerative changes of the lumbar spine, which are most advanced at L5-S1 and are described above.   Dictated by (CST): Stu Garcia MD on 7/13/2024 at 12:48 PM     Finalized by (CST): Stu Garcia MD on 7/13/2024 at 12:53 PM          MRI HIP LIMITED FX (2) SEQUENCES RIGHT (CPT = 95334)    Result Date: 7/12/2024  CONCLUSION:  No MR evidence of acute fracture of the right hip.    Results of this examination were discussed with the patient's physician, Dr. Mayer, by Dr. Ramon at 1748 on 07/12/2024.  Dictated by (CST): Migel Ramon MD on 7/12/2024 at 5:48 PM     Finalized by (CST): Migel Ramon MD on 7/12/2024 at 5:54 PM          XR CHEST AP PORTABLE  (CPT=71045)    Result Date: 7/12/2024  CONCLUSION: No radiographic evidence of acute cardiopulmonary abnormality.    Dictated by (CST): Hari Rowan MD on 7/12/2024 at 1:15 PM     Finalized by (CST): Hari Rowan MD on 7/12/2024 at 1:15 PM          CT HIP(BONE) RIGHT (CPT=73700)    Result Date: 7/12/2024  CONCLUSION:  Nondisplaced subcapital fracture.       Dictated by (CST): Ronen Thompson MD on 7/12/2024 at 10:29 AM     Finalized by (CST): Ronen Thompson MD on 7/12/2024 at 10:43 AM          XR HIP W OR WO PELVIS 2 OR 3 VIEWS, RIGHT (CPT=73502)    Result Date: 7/12/2024  CONCLUSION:  1. No acute appearing fracture or dislocation.  Mild central narrowing and mild degenerative change of both hip joints.    Dictated by (CST): Rene Dawson MD on 7/12/2024 at 9:02 AM     Finalized by (CST): Rene Dawson MD on 7/12/2024 at 9:03 AM            Disposition: home    Activity: activity as tolerated  Diet: regular diet  Wound Care: none needed  Code Status: Full Code  O2: none    Home Medication Changes: none    Med list     Medication List        START taking these medications      HYDROcodone-acetaminophen 5-325 MG Tabs  Commonly known as: Norco  Take 1 tablet by mouth every 6 (six) hours as needed.     tiZANidine HCl 4 MG Caps  Commonly known as: ZANAFLEX  Take 1 capsule (4 mg total) by mouth 3 (three) times daily as needed for Muscle spasms.            CONTINUE taking these medications      cholecalciferol 1000 UNITS Caps  Commonly known as: Vitamin D3     levothyroxine 25 MCG Tabs  Commonly known as: Synthroid  TAKE 1 TABLET BEFORE       BREAKFAST     losartan 100 MG Tabs  Commonly known as: Cozaar  TAKE 1 TABLET DAILY     ONE-A-DAY FOR HIM VITACRAVES OR     triamterene-hydroCHLOROthiazide 37.5-25 MG Caps  Commonly known as: Dyazide  TAKE 1 CAPSULE EVERY       MORNING               Where to Get Your Medications        These medications were sent to [id number] - [place], [place] - [address] AT [facility], [phone], [phone]  [address], [facility] [address]      Phone: [phone]   tiZANidine HCl 4 MG Caps       You can get these medications from any pharmacy    Bring a paper prescription for each of these medications  HYDROcodone-acetaminophen 5-325 MG Tabs         FU   Follow-up Information       Norman Townsend MD Follow up in 3 day(s).    Specialties: Internal Medicine, PEDIATRICS  Why: You will be called to arrange close follow-up with your regular provider  Contact information:  [address] [facility]  [address]  [facility] [address] [phone]                             PA instructions:      Other Discharge Instructions:         You can use the pain meds as needed, and then try tylenol as needed, advise heat to lateral hip and stretches as able         I reconciled current  and discharge medications on day of discharge, discussed changes with patient and noted changes above.       Total Time Coordinating Care: 35 minutes    Patient had opportunity to ask questions and state understand and agree with therapeutic plan as outlined    Thank You,    Lucian Mcdermott DO   Hospitalist with [facility]

## 2024-07-14 NOTE — HOME CARE LIAISON
Received referral via [facility] for Home Health services. Spoke w/ patient via the phone and also confirmed with [facility]UN[facility] and patient agreeable for all home health care. Updated AVS with contact information.

## 2025-04-12 ENCOUNTER — HOSPITAL ENCOUNTER (EMERGENCY)
Age: 80
Discharge: HOME | End: 2025-04-12
Attending: STUDENT IN AN ORGANIZED HEALTH CARE EDUCATION/TRAINING PROGRAM

## 2025-04-12 ENCOUNTER — APPOINTMENT (OUTPATIENT)
Dept: CT IMAGING | Age: 80
End: 2025-04-12
Attending: STUDENT IN AN ORGANIZED HEALTH CARE EDUCATION/TRAINING PROGRAM

## 2025-04-12 VITALS
HEART RATE: 69 BPM | OXYGEN SATURATION: 97 % | DIASTOLIC BLOOD PRESSURE: 63 MMHG | TEMPERATURE: 97.9 F | SYSTOLIC BLOOD PRESSURE: 119 MMHG | RESPIRATION RATE: 11 BRPM

## 2025-04-12 DIAGNOSIS — R33.9 URINARY RETENTION: Primary | ICD-10-CM

## 2025-04-12 LAB
ALBUMIN SERPL-MCNC: 2.8 G/DL (ref 3.4–5)
ALBUMIN/GLOB SERPL: 0.7 {RATIO} (ref 1–2.4)
ALP SERPL-CCNC: 66 UNITS/L (ref 45–117)
ALT SERPL-CCNC: 15 UNITS/L
ANION GAP SERPL CALC-SCNC: 9 MMOL/L (ref 7–19)
APPEARANCE UR: CLEAR
AST SERPL-CCNC: 10 UNITS/L
BACTERIA #/AREA URNS HPF: ABNORMAL /HPF
BASOPHILS # BLD: 0 K/MCL (ref 0–0.3)
BASOPHILS NFR BLD: 0 %
BILIRUB SERPL-MCNC: 0.5 MG/DL (ref 0.2–1)
BILIRUB UR QL STRIP: NEGATIVE
BUN SERPL-MCNC: 27 MG/DL (ref 6–20)
BUN/CREAT SERPL: 18 (ref 7–25)
CALCIUM SERPL-MCNC: 8.9 MG/DL (ref 8.4–10.2)
CHLORIDE SERPL-SCNC: 104 MMOL/L (ref 97–110)
CO2 SERPL-SCNC: 26 MMOL/L (ref 21–32)
COLOR UR: ABNORMAL
CREAT SERPL-MCNC: 1.5 MG/DL (ref 0.67–1.17)
DEPRECATED RDW RBC: 44.7 FL (ref 39–50)
EGFRCR SERPLBLD CKD-EPI 2021: 46 ML/MIN/{1.73_M2}
EOSINOPHIL # BLD: 0.2 K/MCL (ref 0–0.5)
EOSINOPHIL NFR BLD: 1 %
ERYTHROCYTE [DISTWIDTH] IN BLOOD: 13.2 % (ref 11–15)
FASTING DURATION TIME PATIENT: ABNORMAL H
GLOBULIN SER-MCNC: 4.3 G/DL (ref 2–4)
GLUCOSE SERPL-MCNC: 159 MG/DL (ref 70–99)
GLUCOSE UR STRIP-MCNC: NEGATIVE MG/DL
HCT VFR BLD CALC: 39.8 % (ref 39–51)
HGB BLD-MCNC: 13.1 G/DL (ref 13–17)
HGB UR QL STRIP: ABNORMAL
HYALINE CASTS #/AREA URNS LPF: ABNORMAL /LPF
IMM GRANULOCYTES # BLD AUTO: 0.1 K/MCL (ref 0–0.2)
IMM GRANULOCYTES # BLD: 1 %
KETONES UR STRIP-MCNC: NEGATIVE MG/DL
LEUKOCYTE ESTERASE UR QL STRIP: NEGATIVE
LYMPHOCYTES # BLD: 1.7 K/MCL (ref 1–4)
LYMPHOCYTES NFR BLD: 9 %
MCH RBC QN AUTO: 30.2 PG (ref 26–34)
MCHC RBC AUTO-ENTMCNC: 32.9 G/DL (ref 32–36.5)
MCV RBC AUTO: 91.7 FL (ref 78–100)
MONOCYTES # BLD: 1.3 K/MCL (ref 0.3–0.9)
MONOCYTES NFR BLD: 8 %
NEUTROPHILS # BLD: 14.1 K/MCL (ref 1.8–7.7)
NEUTROPHILS NFR BLD: 81 %
NITRITE UR QL STRIP: NEGATIVE
NRBC BLD MANUAL-RTO: 0 /100 WBC
PH UR STRIP: 6.5 [PH] (ref 5–7)
PLATELET # BLD AUTO: 514 K/MCL (ref 140–450)
POTASSIUM SERPL-SCNC: 3.9 MMOL/L (ref 3.4–5.1)
PROT SERPL-MCNC: 7.1 G/DL (ref 6.4–8.2)
PROT UR STRIP-MCNC: ABNORMAL MG/DL
RAINBOW EXTRA TUBES HOLD SPECIMEN: NORMAL
RBC # BLD: 4.34 MIL/MCL (ref 4.5–5.9)
RBC #/AREA URNS HPF: ABNORMAL /HPF
SODIUM SERPL-SCNC: 135 MMOL/L (ref 135–145)
SP GR UR STRIP: 1.02 (ref 1–1.03)
SQUAMOUS #/AREA URNS HPF: ABNORMAL /HPF
UROBILINOGEN UR STRIP-MCNC: 0.2 MG/DL
WBC # BLD: 17.4 K/MCL (ref 4.2–11)
WBC #/AREA URNS HPF: ABNORMAL /HPF

## 2025-04-12 PROCEDURE — 51702 INSERT TEMP BLADDER CATH: CPT

## 2025-04-12 PROCEDURE — 81001 URINALYSIS AUTO W/SCOPE: CPT | Performed by: STUDENT IN AN ORGANIZED HEALTH CARE EDUCATION/TRAINING PROGRAM

## 2025-04-12 PROCEDURE — 10002803 HB RX 637: Performed by: STUDENT IN AN ORGANIZED HEALTH CARE EDUCATION/TRAINING PROGRAM

## 2025-04-12 PROCEDURE — 85025 COMPLETE CBC W/AUTO DIFF WBC: CPT | Performed by: STUDENT IN AN ORGANIZED HEALTH CARE EDUCATION/TRAINING PROGRAM

## 2025-04-12 PROCEDURE — 80053 COMPREHEN METABOLIC PANEL: CPT | Performed by: STUDENT IN AN ORGANIZED HEALTH CARE EDUCATION/TRAINING PROGRAM

## 2025-04-12 PROCEDURE — 10002805 HB CONTRAST AGENT: Performed by: STUDENT IN AN ORGANIZED HEALTH CARE EDUCATION/TRAINING PROGRAM

## 2025-04-12 PROCEDURE — 74177 CT ABD & PELVIS W/CONTRAST: CPT

## 2025-04-12 PROCEDURE — 96374 THER/PROPH/DIAG INJ IV PUSH: CPT

## 2025-04-12 PROCEDURE — 10002800 HB RX 250 W HCPCS: Performed by: STUDENT IN AN ORGANIZED HEALTH CARE EDUCATION/TRAINING PROGRAM

## 2025-04-12 PROCEDURE — 99284 EMERGENCY DEPT VISIT MOD MDM: CPT

## 2025-04-12 PROCEDURE — 36415 COLL VENOUS BLD VENIPUNCTURE: CPT | Performed by: STUDENT IN AN ORGANIZED HEALTH CARE EDUCATION/TRAINING PROGRAM

## 2025-04-12 RX ORDER — OXYBUTYNIN CHLORIDE 5 MG/1
5 TABLET ORAL ONCE
Status: COMPLETED | OUTPATIENT
Start: 2025-04-12 | End: 2025-04-12

## 2025-04-12 RX ORDER — OXYBUTYNIN CHLORIDE 5 MG/1
5 TABLET ORAL 3 TIMES DAILY
Qty: 21 TABLET | Refills: 0 | Status: SHIPPED | OUTPATIENT
Start: 2025-04-12 | End: 2025-04-19

## 2025-04-12 RX ADMIN — MORPHINE SULFATE 4 MG: 4 INJECTION INTRAVENOUS at 10:29

## 2025-04-12 RX ADMIN — IOHEXOL 85 ML: 350 INJECTION, SOLUTION INTRAVENOUS at 12:42

## 2025-04-12 RX ADMIN — OXYBUTYNIN CHLORIDE 5 MG: 5 TABLET ORAL at 14:03

## 2025-04-12 ASSESSMENT — PAIN SCALES - GENERAL: PAINLEVEL_OUTOF10: 0

## 2025-04-14 ENCOUNTER — EXTERNAL LAB (OUTPATIENT)
Dept: HEALTH INFORMATION MANAGEMENT | Facility: OTHER | Age: 80
End: 2025-04-14

## 2025-04-14 LAB — LAB RESULT: NORMAL

## 2025-04-18 ENCOUNTER — EXTERNAL LAB (OUTPATIENT)
Dept: HEALTH INFORMATION MANAGEMENT | Facility: OTHER | Age: 80
End: 2025-04-18

## 2025-04-18 LAB
ALBUMIN SERPL-MCNC: 3.4 G/DL (ref 3.2–4.9)
ALP SERPL-CCNC: 69 U/L (ref 34–143)
ALT SERPL-CCNC: 10 U/L (ref 0–42)
AST SERPL-CCNC: 11 U/L (ref 9–35)
BASOPHILS # BLD: 0.05 THO/MM3 (ref 0–0.1)
BASOPHILS NFR BLD: 0.5 %
BILIRUB SERPL-MCNC: 0.42 MG/DL (ref 0.16–1.3)
BUN SERPL-MCNC: 20 MG/DL (ref 7–28)
CALCIUM SERPL-MCNC: 8.7 MG/DL (ref 8.7–10.5)
CHLORIDE SERPL-SCNC: 105 MEQ/L (ref 99–110)
CO2 SERPL-SCNC: 28 MMOL/L (ref 18–30)
CREAT SERPL-MCNC: 1.64 MG/DL (ref 0.44–1.32)
EGFRCR SERPLBLD CKD-EPI 2021: 40 ML/M/1.73M2
EGFRCR SERPLBLD CKD-EPI 2021: 49 ML/M/1.73M2
EOSINOPHIL # BLD: 0.27 THO/MM3 (ref 0–0.5)
EOSINOPHIL NFR BLD: 2.5 %
ERYTHROCYTE [DISTWIDTH] IN BLOOD: 13.8 % (ref 11.5–15.2)
GLUCOSE SERPL-MCNC: 133 MG/DL (ref 70–110)
HCT VFR BLD CALC: 41.9 % (ref 42–52)
HGB BLD-MCNC: 13.4 G/DL (ref 14–18)
IMM GRANULOCYTES # BLD: 0.17 THO/MM3 (ref 0–0.1)
IMM GRANULOCYTES NFR BLD: 1.6 %
LENGTH OF FAST TIME PATIENT: ABNORMAL H
LYMPHOCYTES # BLD: 1.53 THO/MM3 (ref 0.8–3)
LYMPHOCYTES NFR BLD: 14 %
MCH RBC QN AUTO: 29.8 PG (ref 27–33)
MCHC RBC AUTO-ENTMCNC: 32 G/DL (ref 32–36)
MCV RBC AUTO: 93.1 FL (ref 80–94)
MONOCYTES # BLD: 0.76 THO/MM3 (ref 0.2–1)
MONOCYTES NFR BLD: 6.9 %
NEUTROPHILS # BLD: 8.16 THO/MM3 (ref 1.4–6.8)
NEUTROPHILS NFR BLD: 74.5 %
PLATELET # BLD: 440 THO/MM3 (ref 150–400)
PMV BLD AUTO: 9.8 FL (ref 9.5–13.1)
POTASSIUM SERPL-SCNC: 4.5 MEQ/L (ref 3.6–5)
PROT SERPL-MCNC: 6.4 G/DL (ref 5.6–8.2)
RBC # BLD: 4.5 MIL/MM3 (ref 4.7–6.1)
SODIUM SERPL-SCNC: 142 MEQ/L (ref 138–147)
WBC # BLD: 10.94 THO/MM3 (ref 4.8–10.8)

## 2025-04-23 ENCOUNTER — EXTERNAL LAB (OUTPATIENT)
Dept: HEALTH INFORMATION MANAGEMENT | Facility: OTHER | Age: 80
End: 2025-04-23

## 2025-04-23 LAB
ALBUMIN SERPL-MCNC: 3.6 G/DL (ref 3.2–4.9)
ALP SERPL-CCNC: 74 U/L (ref 34–143)
ALT SERPL-CCNC: 21 U/L (ref 0–42)
AST SERPL-CCNC: 19 U/L (ref 9–35)
BASOPHILS # BLD: 0.05 THO/MM3 (ref 0–0.1)
BASOPHILS NFR BLD: 0.4 %
BILIRUB SERPL-MCNC: 0.55 MG/DL (ref 0.16–1.3)
BUN SERPL-MCNC: 22 MG/DL (ref 7–28)
CALCIUM SERPL-MCNC: 8.6 MG/DL (ref 8.7–10.5)
CHLORIDE SERPL-SCNC: 105 MEQ/L (ref 99–110)
CO2 SERPL-SCNC: 24 MMOL/L (ref 18–30)
CREAT SERPL-MCNC: 1.49 MG/DL (ref 0.44–1.32)
EGFRCR SERPLBLD CKD-EPI 2021: 45 ML/M/1.73M2
EGFRCR SERPLBLD CKD-EPI 2021: 55 ML/M/1.73M2
EOSINOPHIL # BLD: 0.27 THO/MM3 (ref 0–0.5)
EOSINOPHIL NFR BLD: 2.1 %
ERYTHROCYTE [DISTWIDTH] IN BLOOD: 14.2 % (ref 11.5–15.2)
GLUCOSE SERPL-MCNC: 117 MG/DL (ref 70–110)
HCT VFR BLD CALC: 42.5 % (ref 42–52)
HGB BLD-MCNC: 13.9 G/DL (ref 14–18)
IMM GRANULOCYTES # BLD: 0.12 THO/MM3 (ref 0–0.1)
IMM GRANULOCYTES NFR BLD: 0.9 %
LENGTH OF FAST TIME PATIENT: ABNORMAL H
LYMPHOCYTES # BLD: 1.87 THO/MM3 (ref 0.8–3)
LYMPHOCYTES NFR BLD: 14.8 %
MCH RBC QN AUTO: 30.1 PG (ref 27–33)
MCHC RBC AUTO-ENTMCNC: 32.7 G/DL (ref 32–36)
MCV RBC AUTO: 92 FL (ref 80–94)
MONOCYTES # BLD: 0.98 THO/MM3 (ref 0.2–1)
MONOCYTES NFR BLD: 7.8 %
NEUTROPHILS # BLD: 9.35 THO/MM3 (ref 1.4–6.8)
NEUTROPHILS NFR BLD: 74 %
PLATELET # BLD: 391 THO/MM3 (ref 150–400)
PMV BLD AUTO: 9.5 FL (ref 9.5–13.1)
POTASSIUM SERPL-SCNC: 4.3 MEQ/L (ref 3.6–5)
PROT SERPL-MCNC: 6.5 G/DL (ref 5.6–8.2)
RBC # BLD: 4.62 MIL/MM3 (ref 4.7–6.1)
SODIUM SERPL-SCNC: 143 MEQ/L (ref 138–147)
WBC # BLD: 12.64 THO/MM3 (ref 4.8–10.8)

## 2025-04-24 ENCOUNTER — APPOINTMENT (OUTPATIENT)
Dept: CT IMAGING | Age: 80
DRG: 872 | End: 2025-04-24
Attending: EMERGENCY MEDICINE

## 2025-04-24 ENCOUNTER — HOSPITAL ENCOUNTER (INPATIENT)
Age: 80
Discharge: NURSING, REHAB OR LONG-TERM CARE | DRG: 872 | End: 2025-04-24
Attending: EMERGENCY MEDICINE | Admitting: INTERNAL MEDICINE

## 2025-04-24 DIAGNOSIS — R33.8 ACUTE URINARY RETENTION: Primary | ICD-10-CM

## 2025-04-24 DIAGNOSIS — N13.8 BPH WITH OBSTRUCTION/LOWER URINARY TRACT SYMPTOMS: ICD-10-CM

## 2025-04-24 DIAGNOSIS — M47.816 SPONDYLOSIS OF LUMBAR REGION WITHOUT MYELOPATHY OR RADICULOPATHY: ICD-10-CM

## 2025-04-24 DIAGNOSIS — D72.829 LEUKOCYTOSIS, UNSPECIFIED TYPE: ICD-10-CM

## 2025-04-24 DIAGNOSIS — N18.31 STAGE 3A CHRONIC KIDNEY DISEASE  (CMD): ICD-10-CM

## 2025-04-24 DIAGNOSIS — R31.0 GROSS HEMATURIA: ICD-10-CM

## 2025-04-24 DIAGNOSIS — R65.10 SIRS (SYSTEMIC INFLAMMATORY RESPONSE SYNDROME)  (CMD): ICD-10-CM

## 2025-04-24 DIAGNOSIS — N40.1 BPH WITH OBSTRUCTION/LOWER URINARY TRACT SYMPTOMS: ICD-10-CM

## 2025-04-24 DIAGNOSIS — Z87.891 PERSONAL HISTORY OF NICOTINE DEPENDENCE: ICD-10-CM

## 2025-04-24 DIAGNOSIS — N30.01 ACUTE HEMORRHAGIC CYSTITIS: ICD-10-CM

## 2025-04-24 PROBLEM — E03.9 HYPOTHYROIDISM: Status: ACTIVE | Noted: 2021-08-12

## 2025-04-24 PROBLEM — N18.32 HYPERTENSIVE KIDNEY DISEASE WITH STAGE 3B CHRONIC KIDNEY DISEASE  (CMD): Status: ACTIVE | Noted: 2018-02-01

## 2025-04-24 PROBLEM — G89.29 CHRONIC RIGHT HIP PAIN: Status: ACTIVE | Noted: 2024-02-21

## 2025-04-24 PROBLEM — I10 ESSENTIAL HYPERTENSION: Status: ACTIVE | Noted: 2018-02-01

## 2025-04-24 PROBLEM — N18.30 CONTROLLED TYPE 2 DIABETES MELLITUS WITH STAGE 3 CHRONIC KIDNEY DISEASE, WITHOUT LONG-TERM CURRENT USE OF INSULIN  (CMD): Status: ACTIVE | Noted: 2025-03-05

## 2025-04-24 PROBLEM — M25.561 CHRONIC PAIN OF RIGHT KNEE: Status: ACTIVE | Noted: 2024-02-21

## 2025-04-24 PROBLEM — I12.9 HYPERTENSIVE KIDNEY DISEASE WITH STAGE 3B CHRONIC KIDNEY DISEASE  (CMD): Status: ACTIVE | Noted: 2018-02-01

## 2025-04-24 PROBLEM — N18.32 STAGE 3B CHRONIC KIDNEY DISEASE  (CMD): Status: ACTIVE | Noted: 2019-04-06

## 2025-04-24 PROBLEM — M25.551 CHRONIC RIGHT HIP PAIN: Status: ACTIVE | Noted: 2024-02-21

## 2025-04-24 PROBLEM — Z86.0100 HISTORY OF COLON POLYPS: Status: ACTIVE | Noted: 2018-02-01

## 2025-04-24 PROBLEM — S72.001A CLOSED RIGHT HIP FRACTURE, INITIAL ENCOUNTER (CMD): Status: ACTIVE | Noted: 2024-07-12

## 2025-04-24 PROBLEM — M17.11 OSTEOARTHRITIS OF RIGHT KNEE: Status: ACTIVE | Noted: 2024-02-21

## 2025-04-24 PROBLEM — E11.22 CONTROLLED TYPE 2 DIABETES MELLITUS WITH STAGE 3 CHRONIC KIDNEY DISEASE, WITHOUT LONG-TERM CURRENT USE OF INSULIN  (CMD): Status: ACTIVE | Noted: 2025-03-05

## 2025-04-24 PROBLEM — M46.96 UNSPECIFIED INFLAMMATORY SPONDYLOPATHY, LUMBAR REGION: Status: ACTIVE | Noted: 2021-12-22

## 2025-04-24 PROBLEM — I70.0 THORACIC AORTA ATHEROSCLEROSIS (CMD): Status: ACTIVE | Noted: 2018-02-01

## 2025-04-24 PROBLEM — I77.1 TORTUOUS ARTERY (CMD): Status: ACTIVE | Noted: 2023-04-26

## 2025-04-24 PROBLEM — G89.29 CHRONIC PAIN OF RIGHT KNEE: Status: ACTIVE | Noted: 2024-02-21

## 2025-04-24 PROBLEM — N18.30 CKD (CHRONIC KIDNEY DISEASE) STAGE 3, GFR 30-59 ML/MIN  (CMD): Status: ACTIVE | Noted: 2019-04-06

## 2025-04-24 PROBLEM — I70.0 AORTIC CALCIFICATION (CMD): Status: ACTIVE | Noted: 2018-02-01

## 2025-04-24 LAB
ALBUMIN SERPL-MCNC: 3 G/DL (ref 3.4–5)
ALP SERPL-CCNC: 79 UNITS/L (ref 45–117)
ALT SERPL-CCNC: 20 UNITS/L
ANION GAP SERPL CALC-SCNC: 8 MMOL/L (ref 7–19)
APPEARANCE UR: ABNORMAL
APTT PPP: 29 SEC (ref 22–32)
AST SERPL-CCNC: 14 UNITS/L
ATRIAL RATE (BPM): 122
BACTERIA #/AREA URNS HPF: ABNORMAL /HPF
BASOPHILS # BLD: 0 K/MCL (ref 0–0.3)
BASOPHILS NFR BLD: 0 %
BILIRUB CONJ SERPL-MCNC: 0.2 MG/DL (ref 0–0.2)
BILIRUB SERPL-MCNC: 1.1 MG/DL (ref 0.2–1)
BILIRUB UR QL STRIP: NEGATIVE
BUN SERPL-MCNC: 20 MG/DL (ref 6–20)
BUN/CREAT SERPL: 14 (ref 7–25)
CALCIUM SERPL-MCNC: 8.7 MG/DL (ref 8.4–10.2)
CHLORIDE SERPL-SCNC: 100 MMOL/L (ref 97–110)
CO2 SERPL-SCNC: 23 MMOL/L (ref 21–32)
COLOR UR: ABNORMAL
CREAT SERPL-MCNC: 1.46 MG/DL (ref 0.67–1.17)
CREAT UR-MCNC: 113 MG/DL
DEPRECATED RDW RBC: 43.8 FL (ref 39–50)
EGFRCR SERPLBLD CKD-EPI 2021: 48 ML/MIN/{1.73_M2}
EOSINOPHIL # BLD: 0 K/MCL (ref 0–0.5)
EOSINOPHIL NFR BLD: 0 %
ERYTHROCYTE [DISTWIDTH] IN BLOOD: 13.4 % (ref 11–15)
FASTING DURATION TIME PATIENT: ABNORMAL H
GLUCOSE SERPL-MCNC: 176 MG/DL (ref 70–99)
GLUCOSE UR STRIP-MCNC: NEGATIVE MG/DL
HBA1C MFR BLD: 6.5 % (ref 4.5–5.6)
HCT VFR BLD CALC: 42.1 % (ref 39–51)
HGB BLD-MCNC: 14.4 G/DL (ref 13–17)
HGB UR QL STRIP: ABNORMAL
HYALINE CASTS #/AREA URNS LPF: ABNORMAL /LPF
IMM GRANULOCYTES # BLD AUTO: 0.2 K/MCL (ref 0–0.2)
IMM GRANULOCYTES # BLD: 1 %
INR PPP: 1.1
KETONES UR STRIP-MCNC: NEGATIVE MG/DL
LACTATE BLDV-SCNC: 2.3 MMOL/L (ref 0–2)
LACTATE BLDV-SCNC: 2.3 MMOL/L (ref 0–2)
LACTATE BLDV-SCNC: 3.1 MMOL/L (ref 0–2)
LEUKOCYTE ESTERASE UR QL STRIP: ABNORMAL
LYMPHOCYTES # BLD: 1.3 K/MCL (ref 1–4)
LYMPHOCYTES NFR BLD: 5 %
MCH RBC QN AUTO: 30.4 PG (ref 26–34)
MCHC RBC AUTO-ENTMCNC: 34.2 G/DL (ref 32–36.5)
MCV RBC AUTO: 89 FL (ref 78–100)
MONOCYTES # BLD: 1.5 K/MCL (ref 0.3–0.9)
MONOCYTES NFR BLD: 6 %
MUCOUS THREADS URNS QL MICRO: PRESENT
NEUTROPHILS # BLD: 23.3 K/MCL (ref 1.8–7.7)
NEUTROPHILS NFR BLD: 88 %
NITRITE UR QL STRIP: POSITIVE
NRBC BLD MANUAL-RTO: 0 /100 WBC
OSMOLALITY SERPL: 282 MOSM/KG (ref 275–300)
OSMOLALITY UR: 506 MOSM/KG (ref 50–1200)
P AXIS (DEGREES): 29
PH UR STRIP: 5.5 [PH] (ref 5–7)
PLATELET # BLD AUTO: 351 K/MCL (ref 140–450)
POTASSIUM SERPL-SCNC: 4 MMOL/L (ref 3.4–5.1)
PR-INTERVAL (MSEC): 140
PROCALCITONIN SERPL IA-MCNC: 0.06 NG/ML
PROT SERPL-MCNC: 6.7 G/DL (ref 6.4–8.2)
PROT UR STRIP-MCNC: 100 MG/DL
PROTHROMBIN TIME: 11.9 SEC (ref 9.7–11.8)
QRS-INTERVAL (MSEC): 80
QT-INTERVAL (MSEC): 322
QTC: 459
R AXIS (DEGREES): 15
RAINBOW EXTRA TUBES HOLD SPECIMEN: NORMAL
RBC # BLD: 4.73 MIL/MCL (ref 4.5–5.9)
RBC #/AREA URNS HPF: >100 /HPF
REPORT TEXT: NORMAL
SODIUM SERPL-SCNC: 127 MMOL/L (ref 135–145)
SODIUM UR-SCNC: 64 MMOL/L
SP GR UR STRIP: 1.02 (ref 1–1.03)
SQUAMOUS #/AREA URNS HPF: ABNORMAL /HPF
T AXIS (DEGREES): 43
UROBILINOGEN UR STRIP-MCNC: 0.2 MG/DL
VENTRICULAR RATE EKG/MIN (BPM): 122
WBC # BLD: 26.5 K/MCL (ref 4.2–11)
WBC #/AREA URNS HPF: >100 /HPF

## 2025-04-24 PROCEDURE — 36415 COLL VENOUS BLD VENIPUNCTURE: CPT | Performed by: EMERGENCY MEDICINE

## 2025-04-24 PROCEDURE — 10006031 HB ROOM CHARGE TELEMETRY

## 2025-04-24 PROCEDURE — 80076 HEPATIC FUNCTION PANEL: CPT | Performed by: EMERGENCY MEDICINE

## 2025-04-24 PROCEDURE — 93005 ELECTROCARDIOGRAM TRACING: CPT | Performed by: EMERGENCY MEDICINE

## 2025-04-24 PROCEDURE — 84145 PROCALCITONIN (PCT): CPT | Performed by: EMERGENCY MEDICINE

## 2025-04-24 PROCEDURE — 10002805 HB CONTRAST AGENT: Performed by: EMERGENCY MEDICINE

## 2025-04-24 PROCEDURE — 93010 ELECTROCARDIOGRAM REPORT: CPT | Performed by: INTERNAL MEDICINE

## 2025-04-24 PROCEDURE — 10002803 HB RX 637: Performed by: INTERNAL MEDICINE

## 2025-04-24 PROCEDURE — 83935 ASSAY OF URINE OSMOLALITY: CPT | Performed by: EMERGENCY MEDICINE

## 2025-04-24 PROCEDURE — 10002807 HB RX 258: Performed by: EMERGENCY MEDICINE

## 2025-04-24 PROCEDURE — 10002801 HB RX 250 W/O HCPCS: Performed by: EMERGENCY MEDICINE

## 2025-04-24 PROCEDURE — 80048 BASIC METABOLIC PNL TOTAL CA: CPT | Performed by: EMERGENCY MEDICINE

## 2025-04-24 PROCEDURE — 87086 URINE CULTURE/COLONY COUNT: CPT | Performed by: EMERGENCY MEDICINE

## 2025-04-24 PROCEDURE — 10004651 HB RX, NO CHARGE ITEM: Performed by: INTERNAL MEDICINE

## 2025-04-24 PROCEDURE — 10002807 HB RX 258: Performed by: INTERNAL MEDICINE

## 2025-04-24 PROCEDURE — 84300 ASSAY OF URINE SODIUM: CPT | Performed by: EMERGENCY MEDICINE

## 2025-04-24 PROCEDURE — 85610 PROTHROMBIN TIME: CPT | Performed by: EMERGENCY MEDICINE

## 2025-04-24 PROCEDURE — 83930 ASSAY OF BLOOD OSMOLALITY: CPT | Performed by: EMERGENCY MEDICINE

## 2025-04-24 PROCEDURE — 96375 TX/PRO/DX INJ NEW DRUG ADDON: CPT

## 2025-04-24 PROCEDURE — 87040 BLOOD CULTURE FOR BACTERIA: CPT | Performed by: EMERGENCY MEDICINE

## 2025-04-24 PROCEDURE — 74177 CT ABD & PELVIS W/CONTRAST: CPT

## 2025-04-24 PROCEDURE — 10002800 HB RX 250 W HCPCS: Performed by: EMERGENCY MEDICINE

## 2025-04-24 PROCEDURE — 83605 ASSAY OF LACTIC ACID: CPT | Performed by: EMERGENCY MEDICINE

## 2025-04-24 PROCEDURE — 10002803 HB RX 637: Performed by: STUDENT IN AN ORGANIZED HEALTH CARE EDUCATION/TRAINING PROGRAM

## 2025-04-24 PROCEDURE — 51700 IRRIGATION OF BLADDER: CPT

## 2025-04-24 PROCEDURE — 85025 COMPLETE CBC W/AUTO DIFF WBC: CPT | Performed by: EMERGENCY MEDICINE

## 2025-04-24 PROCEDURE — 99285 EMERGENCY DEPT VISIT HI MDM: CPT

## 2025-04-24 PROCEDURE — 82570 ASSAY OF URINE CREATININE: CPT | Performed by: EMERGENCY MEDICINE

## 2025-04-24 PROCEDURE — 85730 THROMBOPLASTIN TIME PARTIAL: CPT | Performed by: EMERGENCY MEDICINE

## 2025-04-24 PROCEDURE — 81001 URINALYSIS AUTO W/SCOPE: CPT | Performed by: EMERGENCY MEDICINE

## 2025-04-24 PROCEDURE — 83036 HEMOGLOBIN GLYCOSYLATED A1C: CPT | Performed by: EMERGENCY MEDICINE

## 2025-04-24 PROCEDURE — 10002803 HB RX 637: Performed by: EMERGENCY MEDICINE

## 2025-04-24 PROCEDURE — 96374 THER/PROPH/DIAG INJ IV PUSH: CPT

## 2025-04-24 RX ORDER — ACETAMINOPHEN 500 MG
1000 TABLET ORAL EVERY 6 HOURS PRN
COMMUNITY
Start: 2025-03-31

## 2025-04-24 RX ORDER — GABAPENTIN 100 MG/1
200 CAPSULE ORAL 3 TIMES DAILY
COMMUNITY

## 2025-04-24 RX ORDER — TAMSULOSIN HYDROCHLORIDE 0.4 MG/1
0.4 CAPSULE ORAL
Status: DISCONTINUED | OUTPATIENT
Start: 2025-04-24 | End: 2025-04-28 | Stop reason: HOSPADM

## 2025-04-24 RX ORDER — LEVOTHYROXINE SODIUM 50 UG/1
50 TABLET ORAL
COMMUNITY
Start: 2024-07-26 | End: 2025-07-21

## 2025-04-24 RX ORDER — ACETAMINOPHEN 650 MG/1
650 SUPPOSITORY RECTAL EVERY 4 HOURS PRN
Status: DISCONTINUED | OUTPATIENT
Start: 2025-04-24 | End: 2025-04-28 | Stop reason: HOSPADM

## 2025-04-24 RX ORDER — TAMSULOSIN HYDROCHLORIDE 0.4 MG/1
0.4 CAPSULE ORAL EVERY EVENING
COMMUNITY
Start: 2025-03-31

## 2025-04-24 RX ORDER — OXYBUTYNIN CHLORIDE 5 MG/1
5 TABLET ORAL ONCE
Status: DISCONTINUED | OUTPATIENT
Start: 2025-04-24 | End: 2025-04-24

## 2025-04-24 RX ORDER — CYCLOBENZAPRINE HCL 10 MG
10 TABLET ORAL 3 TIMES DAILY PRN
Status: DISCONTINUED | OUTPATIENT
Start: 2025-04-24 | End: 2025-04-24

## 2025-04-24 RX ORDER — OXYBUTYNIN CHLORIDE 5 MG/1
5 TABLET ORAL ONCE
Status: COMPLETED | OUTPATIENT
Start: 2025-04-24 | End: 2025-04-24

## 2025-04-24 RX ORDER — OXYCODONE HYDROCHLORIDE 5 MG/1
5 TABLET ORAL EVERY 6 HOURS PRN
COMMUNITY
Start: 2025-03-31

## 2025-04-24 RX ORDER — 0.9 % SODIUM CHLORIDE 0.9 %
10 VIAL (ML) INJECTION PRN
Status: DISCONTINUED | OUTPATIENT
Start: 2025-04-24 | End: 2025-04-28 | Stop reason: HOSPADM

## 2025-04-24 RX ORDER — SODIUM CHLORIDE, SODIUM LACTATE, POTASSIUM CHLORIDE, CALCIUM CHLORIDE 600; 310; 30; 20 MG/100ML; MG/100ML; MG/100ML; MG/100ML
INJECTION, SOLUTION INTRAVENOUS CONTINUOUS
Status: DISCONTINUED | OUTPATIENT
Start: 2025-04-24 | End: 2025-04-28

## 2025-04-24 RX ORDER — FINASTERIDE 5 MG/1
5 TABLET, FILM COATED ORAL DAILY
Status: DISCONTINUED | OUTPATIENT
Start: 2025-04-24 | End: 2025-04-28 | Stop reason: HOSPADM

## 2025-04-24 RX ORDER — OXYBUTYNIN CHLORIDE 5 MG/1
5 TABLET ORAL EVERY 8 HOURS PRN
Status: ON HOLD | COMMUNITY
Start: 2025-04-17 | End: 2025-04-28 | Stop reason: HOSPADM

## 2025-04-24 RX ORDER — CYCLOBENZAPRINE HCL 5 MG
5 TABLET ORAL 3 TIMES DAILY PRN
Status: DISCONTINUED | OUTPATIENT
Start: 2025-04-24 | End: 2025-04-28 | Stop reason: HOSPADM

## 2025-04-24 RX ORDER — LIDOCAINE HYDROCHLORIDE 20 MG/ML
5 JELLY TOPICAL ONCE
Status: COMPLETED | OUTPATIENT
Start: 2025-04-24 | End: 2025-04-24

## 2025-04-24 RX ORDER — ASPIRIN 81 MG/1
81 TABLET ORAL 2 TIMES DAILY
COMMUNITY
Start: 2025-03-29

## 2025-04-24 RX ORDER — ACETAMINOPHEN 325 MG/1
650 TABLET ORAL EVERY 4 HOURS PRN
Status: DISCONTINUED | OUTPATIENT
Start: 2025-04-24 | End: 2025-04-28 | Stop reason: HOSPADM

## 2025-04-24 RX ORDER — MINERAL OIL/PETROLATUM,WHITE 41.5-56.8%
2 OINTMENT (GRAM) OPHTHALMIC (EYE) 2 TIMES DAILY PRN
COMMUNITY
Start: 2025-03-31

## 2025-04-24 RX ORDER — 0.9 % SODIUM CHLORIDE 0.9 %
10 VIAL (ML) INJECTION PRN
Status: DISCONTINUED | OUTPATIENT
Start: 2025-04-24 | End: 2025-04-24

## 2025-04-24 RX ORDER — BACITRACIN ZINC 500 [USP'U]/G
1 OINTMENT TOPICAL EVERY 8 HOURS PRN
COMMUNITY
End: 2025-05-01 | Stop reason: CLARIF

## 2025-04-24 RX ORDER — 0.9 % SODIUM CHLORIDE 0.9 %
2 VIAL (ML) INJECTION EVERY 12 HOURS SCHEDULED
Status: DISCONTINUED | OUTPATIENT
Start: 2025-04-24 | End: 2025-04-28 | Stop reason: HOSPADM

## 2025-04-24 RX ORDER — ALPRAZOLAM 0.25 MG
1 TABLET ORAL ONCE
Status: COMPLETED | OUTPATIENT
Start: 2025-04-24 | End: 2025-04-24

## 2025-04-24 RX ORDER — ONDANSETRON 2 MG/ML
4 INJECTION INTRAMUSCULAR; INTRAVENOUS ONCE
Status: COMPLETED | OUTPATIENT
Start: 2025-04-24 | End: 2025-04-24

## 2025-04-24 RX ADMIN — LIDOCAINE HYDROCHLORIDE 5 ML: 20 JELLY TOPICAL at 04:52

## 2025-04-24 RX ADMIN — IOHEXOL 85 ML: 350 INJECTION, SOLUTION INTRAVENOUS at 08:21

## 2025-04-24 RX ADMIN — ALPRAZOLAM 1 MG: 0.25 TABLET ORAL at 08:57

## 2025-04-24 RX ADMIN — ACETAMINOPHEN 650 MG: 325 TABLET ORAL at 16:30

## 2025-04-24 RX ADMIN — SODIUM CHLORIDE, PRESERVATIVE FREE 2 ML: 5 INJECTION INTRAVENOUS at 16:37

## 2025-04-24 RX ADMIN — SODIUM CHLORIDE, POTASSIUM CHLORIDE, SODIUM LACTATE AND CALCIUM CHLORIDE: 600; 310; 30; 20 INJECTION, SOLUTION INTRAVENOUS at 16:36

## 2025-04-24 RX ADMIN — SODIUM CHLORIDE, POTASSIUM CHLORIDE, SODIUM LACTATE AND CALCIUM CHLORIDE 1500 ML: 600; 310; 30; 20 INJECTION, SOLUTION INTRAVENOUS at 09:40

## 2025-04-24 RX ADMIN — CYCLOBENZAPRINE HYDROCHLORIDE 5 MG: 5 TABLET, FILM COATED ORAL at 16:30

## 2025-04-24 RX ADMIN — FINASTERIDE 5 MG: 5 TABLET, FILM COATED ORAL at 17:51

## 2025-04-24 RX ADMIN — ONDANSETRON 4 MG: 2 INJECTION INTRAMUSCULAR; INTRAVENOUS at 07:18

## 2025-04-24 RX ADMIN — TAMSULOSIN HYDROCHLORIDE 0.4 MG: 0.4 CAPSULE ORAL at 17:51

## 2025-04-24 RX ADMIN — OXYBUTYNIN CHLORIDE 5 MG: 5 TABLET ORAL at 07:18

## 2025-04-24 RX ADMIN — SODIUM CHLORIDE 500 ML: 0.9 INJECTION, SOLUTION INTRAVENOUS at 08:11

## 2025-04-24 RX ADMIN — CEFTRIAXONE 1000 MG: 1 INJECTION, POWDER, FOR SOLUTION INTRAMUSCULAR; INTRAVENOUS at 08:09

## 2025-04-24 RX ADMIN — MORPHINE SULFATE 2 MG: 2 INJECTION, SOLUTION INTRAMUSCULAR; INTRAVENOUS at 07:18

## 2025-04-24 SDOH — ECONOMIC STABILITY: TRANSPORTATION INSECURITY
IN THE PAST 12 MONTHS, HAS LACK OF RELIABLE TRANSPORTATION KEPT YOU FROM MEDICAL APPOINTMENTS, MEETINGS, WORK OR FROM GETTING THINGS NEEDED FOR DAILY LIVING?: NO

## 2025-04-24 SDOH — HEALTH STABILITY: GENERAL: BECAUSE OF A PHYSICAL, MENTAL, OR EMOTIONAL CONDITION, DO YOU HAVE DIFFICULTY DOING ERRANDS ALONE?: YES

## 2025-04-24 SDOH — SOCIAL STABILITY: SOCIAL NETWORK
HOW OFTEN DO YOU SEE OR TALK TO PEOPLE THAT YOU CARE ABOUT AND FEEL CLOSE TO? (FOR EXAMPLE: TALKING TO FRIENDS ON THE PHONE, VISITING FRIENDS OR FAMILY, GOING TO CHURCH OR CLUB MEETINGS): 5 OR MORE TIMES A WEEK

## 2025-04-24 SDOH — ECONOMIC STABILITY: INCOME INSECURITY: IN THE PAST 12 MONTHS, HAS THE ELECTRIC, GAS, OIL, OR WATER COMPANY THREATENED TO SHUT OFF SERVICE IN YOUR HOME?: NO

## 2025-04-24 SDOH — ECONOMIC STABILITY: HOUSING INSECURITY: DO YOU HAVE PROBLEMS WITH ANY OF THE FOLLOWING?: NONE OF THE ABOVE

## 2025-04-24 SDOH — ECONOMIC STABILITY: HOUSING INSECURITY: WHAT IS YOUR LIVING SITUATION TODAY?: OTHER FACILITY RESIDENTS

## 2025-04-24 SDOH — HEALTH STABILITY: PHYSICAL HEALTH: DO YOU HAVE SERIOUS DIFFICULTY WALKING OR CLIMBING STAIRS?: YES

## 2025-04-24 SDOH — ECONOMIC STABILITY: FOOD INSECURITY: WITHIN THE PAST 12 MONTHS, THE FOOD YOU BOUGHT JUST DIDN'T LAST AND YOU DIDN'T HAVE MONEY TO GET MORE.: NEVER TRUE

## 2025-04-24 SDOH — SOCIAL STABILITY: SOCIAL INSECURITY: HOW OFTEN DOES ANYONE, INCLUDING FAMILY AND FRIENDS, PHYSICALLY HURT YOU?: NEVER

## 2025-04-24 SDOH — HEALTH STABILITY: GENERAL
BECAUSE OF A PHYSICAL, MENTAL, OR EMOTIONAL CONDITION, DO YOU HAVE SERIOUS DIFFICULTY CONCENTRATING, REMEMBERING OR MAKING DECISIONS?: NO

## 2025-04-24 SDOH — SOCIAL STABILITY: SOCIAL NETWORK: SUPPORT SYSTEMS: CHILDREN

## 2025-04-24 SDOH — ECONOMIC STABILITY: GENERAL

## 2025-04-24 SDOH — ECONOMIC STABILITY: HOUSING INSECURITY: WHAT IS YOUR LIVING SITUATION TODAY?: I HAVE A STEADY PLACE TO LIVE

## 2025-04-24 SDOH — HEALTH STABILITY: PHYSICAL HEALTH: DO YOU HAVE DIFFICULTY DRESSING OR BATHING?: YES

## 2025-04-24 SDOH — SOCIAL STABILITY: SOCIAL INSECURITY: HOW OFTEN DOES ANYONE, INCLUDING FAMILY AND FRIENDS, SCREAM OR CURSE AT YOU?: NEVER

## 2025-04-24 SDOH — SOCIAL STABILITY: SOCIAL INSECURITY: HOW OFTEN DOES ANYONE, INCLUDING FAMILY AND FRIENDS, THREATEN YOU WITH HARM?: NEVER

## 2025-04-24 SDOH — SOCIAL STABILITY: SOCIAL INSECURITY: HOW OFTEN DOES ANYONE, INCLUDING FAMILY AND FRIENDS, INSULT OR TALK DOWN TO YOU?: NEVER

## 2025-04-24 SDOH — ECONOMIC STABILITY: HOUSING INSECURITY: WHAT IS YOUR LIVING SITUATION TODAY?: LONG TERM CARE FACILITY

## 2025-04-24 ASSESSMENT — ENCOUNTER SYMPTOMS
RESPIRATORY NEGATIVE: 1
EYES NEGATIVE: 1
HEMATOLOGIC/LYMPHATIC NEGATIVE: 1
CONSTITUTIONAL NEGATIVE: 1
PSYCHIATRIC NEGATIVE: 1
NEUROLOGICAL NEGATIVE: 1
GASTROINTESTINAL NEGATIVE: 1
ENDOCRINE NEGATIVE: 1

## 2025-04-24 ASSESSMENT — ACTIVITIES OF DAILY LIVING (ADL)
ADL_SHORT_OF_BREATH: NO
TOILETING: NEEDS ASSISTANCE
ADL_BEFORE_ADMISSION: NEEDS/REQUIRES ASSISTANCE
ADL_SCORE: 7
DRESSING: NEEDS ASSISTANCE
FEEDING: INDEPENDENT
RECENT_DECLINE_ADL: NO
BATHING: NEEDS ASSISTANCE

## 2025-04-24 ASSESSMENT — LIFESTYLE VARIABLES
HOW MANY STANDARD DRINKS CONTAINING ALCOHOL DO YOU HAVE ON A TYPICAL DAY: 0,1 OR 2
ALCOHOL_USE_STATUS: NO OR LOW RISK WITH VALIDATED TOOL
HOW OFTEN DO YOU HAVE 6 OR MORE DRINKS ON ONE OCCASION: NEVER
HOW OFTEN DO YOU HAVE A DRINK CONTAINING ALCOHOL: MONTHLY OR LESS
AUDIT-C TOTAL SCORE: 1

## 2025-04-24 ASSESSMENT — PAIN SCALES - GENERAL
PAINLEVEL_OUTOF10: 10
PAINLEVEL_OUTOF10: 0
PAINLEVEL_OUTOF10: 0
PAINLEVEL_OUTOF10: 10

## 2025-04-24 ASSESSMENT — PATIENT HEALTH QUESTIONNAIRE - PHQ9
2. FEELING DOWN, DEPRESSED OR HOPELESS: NOT AT ALL
1. LITTLE INTEREST OR PLEASURE IN DOING THINGS: NOT AT ALL
IS PATIENT ABLE TO COMPLETE PHQ2 OR PHQ9: YES
CLINICAL INTERPRETATION OF PHQ2 SCORE: NO FURTHER SCREENING NEEDED
SUM OF ALL RESPONSES TO PHQ9 QUESTIONS 1 AND 2: 0
SUM OF ALL RESPONSES TO PHQ9 QUESTIONS 1 AND 2: 0

## 2025-04-24 ASSESSMENT — ORIENTATION MEMORY CONCENTRATION TEST (OMCT)
OMCT INTERPRETATION: 0-6: NO SIGNIFICANT IMPAIRMENT
OMCT SCORE: 0
WHAT YEAR IS IT NOW (MUST BE EXACT): CORRECT
WHAT MONTH IS IT NOW: CORRECT
SAY THE MONTHS IN REVERSE ORDER STARTING WITH LAST MONTH: CORRECT
REPEAT THE NAME AND ADDRESS I ASKED YOU TO REMEMBER: CORRECT
WHAT TIME IS IT (NO WATCH OR CLOCK): CORRECT
COUNT BACKWARDS FROM 20 TO 1: CORRECT

## 2025-04-24 ASSESSMENT — COLUMBIA-SUICIDE SEVERITY RATING SCALE - C-SSRS
1. WITHIN THE PAST MONTH, HAVE YOU WISHED YOU WERE DEAD OR WISHED YOU COULD GO TO SLEEP AND NOT WAKE UP?: NO
IS THE PATIENT ABLE TO COMPLETE C-SSRS: YES
6. HAVE YOU EVER DONE ANYTHING, STARTED TO DO ANYTHING, OR PREPARED TO DO ANYTHING TO END YOUR LIFE?: NO
2. HAVE YOU ACTUALLY HAD ANY THOUGHTS OF KILLING YOURSELF?: NO

## 2025-04-25 LAB
ALBUMIN SERPL-MCNC: 2.5 G/DL (ref 3.4–5)
ALBUMIN/GLOB SERPL: 0.8 {RATIO} (ref 1–2.4)
ALP SERPL-CCNC: 63 UNITS/L (ref 45–117)
ALT SERPL-CCNC: 13 UNITS/L
ANION GAP SERPL CALC-SCNC: 8 MMOL/L (ref 7–19)
AST SERPL-CCNC: <8 UNITS/L
BASOPHILS # BLD: 0 K/MCL (ref 0–0.3)
BASOPHILS NFR BLD: 0 %
BILIRUB SERPL-MCNC: 0.6 MG/DL (ref 0.2–1)
BUN SERPL-MCNC: 17 MG/DL (ref 6–20)
BUN/CREAT SERPL: 11 (ref 7–25)
CALCIUM SERPL-MCNC: 8.3 MG/DL (ref 8.4–10.2)
CHLORIDE SERPL-SCNC: 109 MMOL/L (ref 97–110)
CO2 SERPL-SCNC: 25 MMOL/L (ref 21–32)
CREAT SERPL-MCNC: 1.55 MG/DL (ref 0.67–1.17)
DEPRECATED RDW RBC: 47.4 FL (ref 39–50)
EGFRCR SERPLBLD CKD-EPI 2021: 44 ML/MIN/{1.73_M2}
EOSINOPHIL # BLD: 0.3 K/MCL (ref 0–0.5)
EOSINOPHIL NFR BLD: 2 %
ERYTHROCYTE [DISTWIDTH] IN BLOOD: 14.1 % (ref 11–15)
FASTING DURATION TIME PATIENT: ABNORMAL H
GLOBULIN SER-MCNC: 3.3 G/DL (ref 2–4)
GLUCOSE SERPL-MCNC: 138 MG/DL (ref 70–99)
HCT VFR BLD CALC: 36.8 % (ref 39–51)
HGB BLD-MCNC: 12 G/DL (ref 13–17)
IMM GRANULOCYTES # BLD AUTO: 0.1 K/MCL (ref 0–0.2)
IMM GRANULOCYTES # BLD: 1 %
LYMPHOCYTES # BLD: 1.6 K/MCL (ref 1–4)
LYMPHOCYTES NFR BLD: 10 %
MCH RBC QN AUTO: 30.2 PG (ref 26–34)
MCHC RBC AUTO-ENTMCNC: 32.6 G/DL (ref 32–36.5)
MCV RBC AUTO: 92.7 FL (ref 78–100)
MONOCYTES # BLD: 1.2 K/MCL (ref 0.3–0.9)
MONOCYTES NFR BLD: 8 %
NEUTROPHILS # BLD: 12.2 K/MCL (ref 1.8–7.7)
NEUTROPHILS NFR BLD: 79 %
NRBC BLD MANUAL-RTO: 0 /100 WBC
PLATELET # BLD AUTO: 287 K/MCL (ref 140–450)
POTASSIUM SERPL-SCNC: 3.8 MMOL/L (ref 3.4–5.1)
PROT SERPL-MCNC: 5.8 G/DL (ref 6.4–8.2)
RBC # BLD: 3.97 MIL/MCL (ref 4.5–5.9)
SODIUM SERPL-SCNC: 138 MMOL/L (ref 135–145)
WBC # BLD: 15.4 K/MCL (ref 4.2–11)

## 2025-04-25 PROCEDURE — 97535 SELF CARE MNGMENT TRAINING: CPT

## 2025-04-25 PROCEDURE — 10002803 HB RX 637: Performed by: STUDENT IN AN ORGANIZED HEALTH CARE EDUCATION/TRAINING PROGRAM

## 2025-04-25 PROCEDURE — 10004651 HB RX, NO CHARGE ITEM: Performed by: INTERNAL MEDICINE

## 2025-04-25 PROCEDURE — 80053 COMPREHEN METABOLIC PANEL: CPT | Performed by: INTERNAL MEDICINE

## 2025-04-25 PROCEDURE — 97530 THERAPEUTIC ACTIVITIES: CPT

## 2025-04-25 PROCEDURE — 10006031 HB ROOM CHARGE TELEMETRY

## 2025-04-25 PROCEDURE — 97166 OT EVAL MOD COMPLEX 45 MIN: CPT

## 2025-04-25 PROCEDURE — 97161 PT EVAL LOW COMPLEX 20 MIN: CPT

## 2025-04-25 PROCEDURE — 36415 COLL VENOUS BLD VENIPUNCTURE: CPT | Performed by: INTERNAL MEDICINE

## 2025-04-25 PROCEDURE — 10002807 HB RX 258: Performed by: INTERNAL MEDICINE

## 2025-04-25 PROCEDURE — 85025 COMPLETE CBC W/AUTO DIFF WBC: CPT | Performed by: INTERNAL MEDICINE

## 2025-04-25 PROCEDURE — 10002803 HB RX 637: Performed by: INTERNAL MEDICINE

## 2025-04-25 PROCEDURE — 10002800 HB RX 250 W HCPCS: Performed by: INTERNAL MEDICINE

## 2025-04-25 PROCEDURE — 10002801 HB RX 250 W/O HCPCS: Performed by: INTERNAL MEDICINE

## 2025-04-25 RX ADMIN — CYCLOBENZAPRINE HYDROCHLORIDE 5 MG: 5 TABLET, FILM COATED ORAL at 06:28

## 2025-04-25 RX ADMIN — CYCLOBENZAPRINE HYDROCHLORIDE 5 MG: 5 TABLET, FILM COATED ORAL at 22:50

## 2025-04-25 RX ADMIN — FINASTERIDE 5 MG: 5 TABLET, FILM COATED ORAL at 09:05

## 2025-04-25 RX ADMIN — CEFTRIAXONE 2000 MG: 2 INJECTION, POWDER, FOR SOLUTION INTRAMUSCULAR; INTRAVENOUS at 16:34

## 2025-04-25 RX ADMIN — ACETAMINOPHEN 650 MG: 325 TABLET ORAL at 16:34

## 2025-04-25 RX ADMIN — SODIUM CHLORIDE, PRESERVATIVE FREE 2 ML: 5 INJECTION INTRAVENOUS at 21:17

## 2025-04-25 RX ADMIN — SODIUM CHLORIDE, POTASSIUM CHLORIDE, SODIUM LACTATE AND CALCIUM CHLORIDE: 600; 310; 30; 20 INJECTION, SOLUTION INTRAVENOUS at 00:17

## 2025-04-25 RX ADMIN — SODIUM CHLORIDE, POTASSIUM CHLORIDE, SODIUM LACTATE AND CALCIUM CHLORIDE: 600; 310; 30; 20 INJECTION, SOLUTION INTRAVENOUS at 10:31

## 2025-04-25 RX ADMIN — TAMSULOSIN HYDROCHLORIDE 0.4 MG: 0.4 CAPSULE ORAL at 09:05

## 2025-04-25 RX ADMIN — SODIUM CHLORIDE, POTASSIUM CHLORIDE, SODIUM LACTATE AND CALCIUM CHLORIDE: 600; 310; 30; 20 INJECTION, SOLUTION INTRAVENOUS at 21:19

## 2025-04-25 ASSESSMENT — ACTIVITIES OF DAILY LIVING (ADL)
PRIOR_ADL_TOILETING: INDEPENDENT
GROOMING: INDEPENDENT
PRIOR_ADL_BATHING: INDEPENDENT
HOME_MANAGEMENT_TIME_ENTRY: 15
EATING: INDEPENDENT

## 2025-04-25 ASSESSMENT — COGNITIVE AND FUNCTIONAL STATUS - GENERAL
HELP NEEDED FOR BATHING: A LITTLE
HELP NEEDED FOR TOILETING: A LITTLE
HELP NEEDED DRESSING REGULAR UPPER BODY CLOTHING: A LITTLE
BASIC_MOBILITY_CONVERTED_SCORE: 41.05
HELP NEEDED FOR PERSONAL GROOMING: A LITTLE
BASIC_MOBILITY_RAW_SCORE: 18
DAILY_ACTIVITY_RAW_SCORE: 19
DAILY_ACTIVITY_CONVERTED_SCORE: 40.22
HELP NEEDED DRESSING REGULAR LOWER BODY CLOTHING: A LITTLE

## 2025-04-25 ASSESSMENT — PAIN SCALES - GENERAL
PAINLEVEL_OUTOF10: 2
PAINLEVEL_OUTOF10: 3
PAINLEVEL_OUTOF10: 3
PAINLEVEL_OUTOF10: 5
PAINLEVEL_OUTOF10: 0

## 2025-04-26 LAB — BACTERIA UR CULT: ABNORMAL

## 2025-04-26 PROCEDURE — 10002803 HB RX 637: Performed by: STUDENT IN AN ORGANIZED HEALTH CARE EDUCATION/TRAINING PROGRAM

## 2025-04-26 PROCEDURE — 10002807 HB RX 258: Performed by: INTERNAL MEDICINE

## 2025-04-26 PROCEDURE — 10006031 HB ROOM CHARGE TELEMETRY

## 2025-04-26 PROCEDURE — 10002801 HB RX 250 W/O HCPCS: Performed by: UROLOGY

## 2025-04-26 PROCEDURE — 10002800 HB RX 250 W HCPCS: Performed by: INTERNAL MEDICINE

## 2025-04-26 PROCEDURE — 10002801 HB RX 250 W/O HCPCS: Performed by: INTERNAL MEDICINE

## 2025-04-26 PROCEDURE — 10002803 HB RX 637: Performed by: INTERNAL MEDICINE

## 2025-04-26 PROCEDURE — 10004651 HB RX, NO CHARGE ITEM: Performed by: INTERNAL MEDICINE

## 2025-04-26 RX ORDER — LIDOCAINE HYDROCHLORIDE 20 MG/ML
JELLY TOPICAL PRN
Status: DISCONTINUED | OUTPATIENT
Start: 2025-04-26 | End: 2025-04-28 | Stop reason: HOSPADM

## 2025-04-26 RX ADMIN — CYCLOBENZAPRINE HYDROCHLORIDE 5 MG: 5 TABLET, FILM COATED ORAL at 18:25

## 2025-04-26 RX ADMIN — SODIUM CHLORIDE, PRESERVATIVE FREE 2 ML: 5 INJECTION INTRAVENOUS at 20:09

## 2025-04-26 RX ADMIN — CEFTRIAXONE 2000 MG: 2 INJECTION, POWDER, FOR SOLUTION INTRAMUSCULAR; INTRAVENOUS at 08:48

## 2025-04-26 RX ADMIN — SODIUM CHLORIDE, POTASSIUM CHLORIDE, SODIUM LACTATE AND CALCIUM CHLORIDE: 600; 310; 30; 20 INJECTION, SOLUTION INTRAVENOUS at 18:25

## 2025-04-26 RX ADMIN — ACETAMINOPHEN 650 MG: 325 TABLET ORAL at 11:09

## 2025-04-26 RX ADMIN — SODIUM CHLORIDE, POTASSIUM CHLORIDE, SODIUM LACTATE AND CALCIUM CHLORIDE: 600; 310; 30; 20 INJECTION, SOLUTION INTRAVENOUS at 07:47

## 2025-04-26 RX ADMIN — FINASTERIDE 5 MG: 5 TABLET, FILM COATED ORAL at 08:49

## 2025-04-26 RX ADMIN — SODIUM CHLORIDE, PRESERVATIVE FREE 2 ML: 5 INJECTION INTRAVENOUS at 08:51

## 2025-04-26 RX ADMIN — MORPHINE SULFATE 2 MG: 2 INJECTION, SOLUTION INTRAMUSCULAR; INTRAVENOUS at 08:49

## 2025-04-26 RX ADMIN — TAMSULOSIN HYDROCHLORIDE 0.4 MG: 0.4 CAPSULE ORAL at 08:49

## 2025-04-26 RX ADMIN — LIDOCAINE HYDROCHLORIDE 6 ML: 20 JELLY TOPICAL at 18:26

## 2025-04-26 ASSESSMENT — PAIN SCALES - GENERAL
PAINLEVEL_OUTOF10: 3
PAINLEVEL_OUTOF10: 1
PAINLEVEL_OUTOF10: 8
PAINLEVEL_OUTOF10: 3
PAINLEVEL_OUTOF10: 8
PAINLEVEL_OUTOF10: 5
PAINLEVEL_OUTOF10: 5

## 2025-04-27 VITALS
HEIGHT: 66 IN | TEMPERATURE: 98.1 F | SYSTOLIC BLOOD PRESSURE: 130 MMHG | DIASTOLIC BLOOD PRESSURE: 76 MMHG | HEART RATE: 72 BPM | BODY MASS INDEX: 30.72 KG/M2 | OXYGEN SATURATION: 96 % | WEIGHT: 191.14 LBS | RESPIRATION RATE: 17 BRPM

## 2025-04-27 LAB
BACTERIA BLD CULT: NORMAL
BACTERIA BLD CULT: NORMAL

## 2025-04-27 PROCEDURE — 10002803 HB RX 637: Performed by: STUDENT IN AN ORGANIZED HEALTH CARE EDUCATION/TRAINING PROGRAM

## 2025-04-27 PROCEDURE — 10002801 HB RX 250 W/O HCPCS: Performed by: INTERNAL MEDICINE

## 2025-04-27 PROCEDURE — 10002800 HB RX 250 W HCPCS: Performed by: INTERNAL MEDICINE

## 2025-04-27 PROCEDURE — 10004651 HB RX, NO CHARGE ITEM: Performed by: INTERNAL MEDICINE

## 2025-04-27 PROCEDURE — 10002803 HB RX 637: Performed by: INTERNAL MEDICINE

## 2025-04-27 PROCEDURE — 10002801 HB RX 250 W/O HCPCS: Performed by: UROLOGY

## 2025-04-27 PROCEDURE — 10002807 HB RX 258: Performed by: INTERNAL MEDICINE

## 2025-04-27 PROCEDURE — 10006031 HB ROOM CHARGE TELEMETRY

## 2025-04-27 RX ADMIN — ACETAMINOPHEN 650 MG: 325 TABLET ORAL at 08:02

## 2025-04-27 RX ADMIN — CYCLOBENZAPRINE HYDROCHLORIDE 5 MG: 5 TABLET, FILM COATED ORAL at 09:36

## 2025-04-27 RX ADMIN — CEFTRIAXONE 2000 MG: 2 INJECTION, POWDER, FOR SOLUTION INTRAMUSCULAR; INTRAVENOUS at 08:03

## 2025-04-27 RX ADMIN — SODIUM CHLORIDE, POTASSIUM CHLORIDE, SODIUM LACTATE AND CALCIUM CHLORIDE: 600; 310; 30; 20 INJECTION, SOLUTION INTRAVENOUS at 03:18

## 2025-04-27 RX ADMIN — LIDOCAINE HYDROCHLORIDE: 20 JELLY TOPICAL at 08:11

## 2025-04-27 RX ADMIN — SODIUM CHLORIDE, PRESERVATIVE FREE 2 ML: 5 INJECTION INTRAVENOUS at 20:32

## 2025-04-27 RX ADMIN — FINASTERIDE 5 MG: 5 TABLET, FILM COATED ORAL at 08:02

## 2025-04-27 RX ADMIN — SODIUM CHLORIDE, PRESERVATIVE FREE 2 ML: 5 INJECTION INTRAVENOUS at 08:05

## 2025-04-27 RX ADMIN — TAMSULOSIN HYDROCHLORIDE 0.4 MG: 0.4 CAPSULE ORAL at 08:02

## 2025-04-27 RX ADMIN — SODIUM CHLORIDE, POTASSIUM CHLORIDE, SODIUM LACTATE AND CALCIUM CHLORIDE: 600; 310; 30; 20 INJECTION, SOLUTION INTRAVENOUS at 23:38

## 2025-04-27 RX ADMIN — MORPHINE SULFATE 2 MG: 2 INJECTION, SOLUTION INTRAMUSCULAR; INTRAVENOUS at 21:13

## 2025-04-27 ASSESSMENT — PAIN SCALES - GENERAL
PAINLEVEL_OUTOF10: 1
PAINLEVEL_OUTOF10: 10
PAINLEVEL_OUTOF10: 2
PAINLEVEL_OUTOF10: 0
PAINLEVEL_OUTOF10: 0

## 2025-04-28 VITALS
RESPIRATION RATE: 16 BRPM | HEART RATE: 89 BPM | HEIGHT: 66 IN | DIASTOLIC BLOOD PRESSURE: 73 MMHG | OXYGEN SATURATION: 96 % | WEIGHT: 191.14 LBS | TEMPERATURE: 98.6 F | SYSTOLIC BLOOD PRESSURE: 129 MMHG | BODY MASS INDEX: 30.72 KG/M2

## 2025-04-28 LAB
ANION GAP SERPL CALC-SCNC: 7 MMOL/L (ref 7–19)
BASOPHILS # BLD: 0 K/MCL (ref 0–0.3)
BASOPHILS NFR BLD: 0 %
BUN SERPL-MCNC: 16 MG/DL (ref 6–20)
BUN/CREAT SERPL: 11 (ref 7–25)
CALCIUM SERPL-MCNC: 8.6 MG/DL (ref 8.4–10.2)
CHLORIDE SERPL-SCNC: 108 MMOL/L (ref 97–110)
CO2 SERPL-SCNC: 28 MMOL/L (ref 21–32)
CREAT SERPL-MCNC: 1.48 MG/DL (ref 0.67–1.17)
DEPRECATED RDW RBC: 47.4 FL (ref 39–50)
EGFRCR SERPLBLD CKD-EPI 2021: 47 ML/MIN/{1.73_M2}
EOSINOPHIL # BLD: 0.2 K/MCL (ref 0–0.5)
EOSINOPHIL NFR BLD: 2 %
ERYTHROCYTE [DISTWIDTH] IN BLOOD: 13.9 % (ref 11–15)
FASTING DURATION TIME PATIENT: ABNORMAL H
GLUCOSE SERPL-MCNC: 140 MG/DL (ref 70–99)
HCT VFR BLD CALC: 36.4 % (ref 39–51)
HGB BLD-MCNC: 11.9 G/DL (ref 13–17)
IMM GRANULOCYTES # BLD AUTO: 0.1 K/MCL (ref 0–0.2)
IMM GRANULOCYTES # BLD: 1 %
LYMPHOCYTES # BLD: 1.6 K/MCL (ref 1–4)
LYMPHOCYTES NFR BLD: 16 %
MCH RBC QN AUTO: 30.3 PG (ref 26–34)
MCHC RBC AUTO-ENTMCNC: 32.7 G/DL (ref 32–36.5)
MCV RBC AUTO: 92.6 FL (ref 78–100)
MONOCYTES # BLD: 0.6 K/MCL (ref 0.3–0.9)
MONOCYTES NFR BLD: 6 %
NEUTROPHILS # BLD: 7.6 K/MCL (ref 1.8–7.7)
NEUTROPHILS NFR BLD: 75 %
NRBC BLD MANUAL-RTO: 0 /100 WBC
PLATELET # BLD AUTO: 277 K/MCL (ref 140–450)
POTASSIUM SERPL-SCNC: 3.6 MMOL/L (ref 3.4–5.1)
RBC # BLD: 3.93 MIL/MCL (ref 4.5–5.9)
SODIUM SERPL-SCNC: 139 MMOL/L (ref 135–145)
WBC # BLD: 10.1 K/MCL (ref 4.2–11)

## 2025-04-28 PROCEDURE — 10002800 HB RX 250 W HCPCS: Performed by: INTERNAL MEDICINE

## 2025-04-28 PROCEDURE — 85025 COMPLETE CBC W/AUTO DIFF WBC: CPT | Performed by: INTERNAL MEDICINE

## 2025-04-28 PROCEDURE — 10002801 HB RX 250 W/O HCPCS: Performed by: INTERNAL MEDICINE

## 2025-04-28 PROCEDURE — 10004651 HB RX, NO CHARGE ITEM: Performed by: INTERNAL MEDICINE

## 2025-04-28 PROCEDURE — 97535 SELF CARE MNGMENT TRAINING: CPT

## 2025-04-28 PROCEDURE — 36415 COLL VENOUS BLD VENIPUNCTURE: CPT | Performed by: INTERNAL MEDICINE

## 2025-04-28 PROCEDURE — 10002803 HB RX 637: Performed by: STUDENT IN AN ORGANIZED HEALTH CARE EDUCATION/TRAINING PROGRAM

## 2025-04-28 PROCEDURE — 80048 BASIC METABOLIC PNL TOTAL CA: CPT | Performed by: INTERNAL MEDICINE

## 2025-04-28 RX ORDER — FINASTERIDE 5 MG/1
5 TABLET, FILM COATED ORAL DAILY
Qty: 30 TABLET | Refills: 0 | Status: SHIPPED | OUTPATIENT
Start: 2025-04-29

## 2025-04-28 RX ORDER — CEPHALEXIN 500 MG/1
500 TABLET, FILM COATED ORAL 2 TIMES DAILY
Qty: 10 TABLET | Refills: 0 | Status: SHIPPED | OUTPATIENT
Start: 2025-04-28 | End: 2025-05-03

## 2025-04-28 RX ORDER — CYCLOBENZAPRINE HCL 5 MG
5 TABLET ORAL 3 TIMES DAILY PRN
Qty: 42 TABLET | Refills: 0 | Status: SHIPPED | OUTPATIENT
Start: 2025-04-28 | End: 2025-05-12

## 2025-04-28 RX ADMIN — TAMSULOSIN HYDROCHLORIDE 0.4 MG: 0.4 CAPSULE ORAL at 09:57

## 2025-04-28 RX ADMIN — CEFTRIAXONE 2000 MG: 2 INJECTION, POWDER, FOR SOLUTION INTRAMUSCULAR; INTRAVENOUS at 09:57

## 2025-04-28 RX ADMIN — LIDOCAINE HYDROCHLORIDE 6 ML: 20 JELLY TOPICAL at 06:15

## 2025-04-28 RX ADMIN — SODIUM CHLORIDE, PRESERVATIVE FREE 2 ML: 5 INJECTION INTRAVENOUS at 09:57

## 2025-04-28 RX ADMIN — FINASTERIDE 5 MG: 5 TABLET, FILM COATED ORAL at 09:57

## 2025-04-28 RX ADMIN — MORPHINE SULFATE 2 MG: 2 INJECTION, SOLUTION INTRAMUSCULAR; INTRAVENOUS at 05:49

## 2025-04-28 ASSESSMENT — COGNITIVE AND FUNCTIONAL STATUS - GENERAL
DAILY_ACTIVITY_RAW_SCORE: 24
DAILY_ACTIVITY_CONVERTED_SCORE: 57.54

## 2025-04-28 ASSESSMENT — ACTIVITIES OF DAILY LIVING (ADL): HOME_MANAGEMENT_TIME_ENTRY: 19

## 2025-04-28 ASSESSMENT — PAIN SCALES - GENERAL
PAINLEVEL_OUTOF10: 10
PAINLEVEL_OUTOF10: 0
PAINLEVEL_OUTOF10: 0

## 2025-04-29 LAB
BACTERIA BLD CULT: NORMAL
BACTERIA BLD CULT: NORMAL

## 2025-04-30 ENCOUNTER — TELEPHONE (OUTPATIENT)
Dept: CARE COORDINATION | Age: 80
End: 2025-04-30

## 2025-05-01 ENCOUNTER — TELEPHONE (OUTPATIENT)
Dept: CARE COORDINATION | Age: 80
End: 2025-05-01

## 2025-05-07 ENCOUNTER — TELEPHONE (OUTPATIENT)
Dept: CARE COORDINATION | Age: 80
End: 2025-05-07

## 2025-05-08 ENCOUNTER — TELEPHONE (OUTPATIENT)
Dept: CARE COORDINATION | Age: 80
End: 2025-05-08

## 2025-05-14 ENCOUNTER — TELEPHONE (OUTPATIENT)
Dept: CARE COORDINATION | Age: 80
End: 2025-05-14

## 2025-05-15 ENCOUNTER — TELEPHONE (OUTPATIENT)
Dept: CARE COORDINATION | Age: 80
End: 2025-05-15

## 2025-05-21 ENCOUNTER — TELEPHONE (OUTPATIENT)
Dept: CARE COORDINATION | Age: 80
End: 2025-05-21

## 2025-05-28 ENCOUNTER — TELEPHONE (OUTPATIENT)
Dept: CARE COORDINATION | Age: 80
End: 2025-05-28